# Patient Record
Sex: FEMALE | Race: BLACK OR AFRICAN AMERICAN | Employment: FULL TIME | RURAL
[De-identification: names, ages, dates, MRNs, and addresses within clinical notes are randomized per-mention and may not be internally consistent; named-entity substitution may affect disease eponyms.]

---

## 2017-01-13 ENCOUNTER — TELEPHONE (OUTPATIENT)
Dept: FAMILY MEDICINE CLINIC | Age: 27
End: 2017-01-13

## 2017-01-13 DIAGNOSIS — R93.89 ABNORMAL CHEST X-RAY: Primary | ICD-10-CM

## 2017-01-13 NOTE — TELEPHONE ENCOUNTER
----- Message from Jose Dempsey sent at 1/13/2017 10:16 AM EST -----  Regarding: Luis Roger / Telephone  [phone]. Pt stated that when she was last seen on 8/31/16, NP Luis Roger put in orders for a cardiology MRI, but she was not able to be seen due to her schedule. She would like a new MRI order.

## 2017-01-25 ENCOUNTER — HOSPITAL ENCOUNTER (OUTPATIENT)
Dept: MRI IMAGING | Age: 27
Discharge: HOME | End: 2017-01-25
Attending: NURSE PRACTITIONER
Payer: COMMERCIAL

## 2017-01-25 DIAGNOSIS — R93.89 ABNORMAL CHEST X-RAY: ICD-10-CM

## 2017-01-25 PROCEDURE — 75557 CARDIAC MRI FOR MORPH: CPT

## 2017-01-26 ENCOUNTER — TELEPHONE (OUTPATIENT)
Dept: FAMILY MEDICINE CLINIC | Age: 27
End: 2017-01-26

## 2017-01-26 NOTE — TELEPHONE ENCOUNTER
Pt calling on lab results.  She was told she would be able to get them from Marie today from [facility]

## 2017-01-26 NOTE — TELEPHONE ENCOUNTER
Spoke with patient and advised per Maribell, results of MRI normal. Will send letter. Patient expressed understanding.

## 2017-01-26 NOTE — TELEPHONE ENCOUNTER
----- Message from Zaire Garcia sent at 1/26/2017  1:33 PM EST -----  Regarding: ABDIRIZAK Carter/Telephone  Pt would like results from MRI she had done on yesterday. Pt best contact number is [phone].

## 2017-03-28 ENCOUNTER — OFFICE VISIT (OUTPATIENT)
Dept: OBGYN CLINIC | Age: 27
End: 2017-03-28

## 2017-03-28 VITALS
HEIGHT: 64 IN | DIASTOLIC BLOOD PRESSURE: 90 MMHG | WEIGHT: 223.2 LBS | BODY MASS INDEX: 38.1 KG/M2 | SYSTOLIC BLOOD PRESSURE: 142 MMHG

## 2017-03-28 DIAGNOSIS — Z30.432 ENCOUNTER FOR IUD REMOVAL: Primary | ICD-10-CM

## 2017-03-28 DIAGNOSIS — Z30.433 ENCOUNTER FOR REMOVAL AND REINSERTION OF IUD: ICD-10-CM

## 2017-03-28 NOTE — PROGRESS NOTES
Chief Complaint   Removal Iud         JUVENAL Vicente is a 32 y.o. female who presents for the evaluation of removal of IUD, it is due to  on 2017. She is not sure if she wants another IUD and would like to discuss birth control options. Patient requested to have std testing done today. She is due for her annual in May and will call to schedule appointment. No LMP recorded. Patient is not currently having periods (Reason: IUD). Past Medical History:   Diagnosis Date    Abnormal chest x-ray     Abnormal Pap smear     Encounter for IUD insertion 12    mirena    HPV vaccine counseling     Pt completed Gardasil series    HSV-2 infection     PPD positive      Past Surgical History:   Procedure Laterality Date    HX COLPOSCOPY      negative     Social History     Occupational History    Not on file. Social History Main Topics    Smoking status: Never Smoker    Smokeless tobacco: Never Used    Alcohol use No    Drug use: No    Sexual activity: Yes     Partners: Male     Birth control/ protection: IUD     Family History   Problem Relation Age of Onset    Anemia Mother     Sickle Cell Anemia Sister        Allergies   Allergen Reactions    Codeine Unknown (comments)    Pcn [Penicillins] Unknown (comments)     Prior to Admission medications    Medication Sig Start Date End Date Taking? Authorizing Provider   MULTIVIT WITH CALCIUM,IRON,MIN [facility] DAILY MULTIVITAMIN PO) Take  by mouth.    Yes Phys Other, MD        Review of Systems: History obtained from the patient  Constitutional: negative for weight loss, fever, night sweats  Breast: negative for breast lumps, nipple discharge, galactorrhea  GI: negative for change in bowel habits, abdominal pain, black or bloody stools  : negative for frequency, dysuria, hematuria, vaginal discharge  MSK: negative for back pain, joint pain, muscle pain  Skin: negative for itching, rash, hives  Psych: negative for anxiety, depression, change in mood      Objective:  Visit Vitals    /90    Ht 5' 4\" (1.626 m)    Wt 223 lb 3.2 oz (101.2 kg)    BMI 38.31 kg/m2       Physical Exam:   PHYSICAL EXAMINATION    Constitutional  · Appearance: well-nourished, well developed, alert, in no acute distress    Gastrointestinal  · Abdominal Examination: abdomen non-tender to palpation, normal bowel sounds, no masses present  · Liver and spleen: no hepatomegaly present, spleen not palpable  · Hernias: no hernias identified    Genitourinary  · External Genitalia: normal appearance for age, no discharge present, no tenderness present, no inflammatory lesions present, no masses present, no atrophy present  · Vagina: normal vaginal vault without central or paravaginal defects, no discharge present, no inflammatory lesions present, no masses present  · Bladder: non-tender to palpation  · Urethra: appears normal  · Cervix: normal   · Uterus: normal size, shape and consistency  · Adnexa: no adnexal tenderness present, no adnexal masses present  · Perineum: perineum within normal limits, no evidence of trauma, no rashes or skin lesions present  · Anus: anus within normal limits, no hemorrhoids present  · Inguinal Lymph Nodes: no lymphadenopathy present    Skin  · General Inspection: no rash, no lesions identified    Neurologic/Psychiatric  · Mental Status:  · Orientation: grossly oriented to person, place and time  · Mood and Affect: mood normal, affect appropriate    Assessment:       Plan:         RTO prn if symptoms persist or worsen. Instructions given to pt. Handouts given to pt.

## 2017-03-28 NOTE — PROGRESS NOTES
[facility]  OFFICE PROCEDURE PROGRESS NOTE        Chart reviewed for the following:   Juan Del Angel LPN, have reviewed the History, Physical and updated the Allergic reactions for Stephanie Palomino     TIME OUT performed immediately prior to start of procedure:   Shelbie SZYMANSKI LPN, have performed the following reviews on Stephanie PADDY Palomino prior to the start of the procedure:            * Patient was identified by name and date of birth   * Agreement on procedure being performed was verified  * Risks and Benefits explained to the patient  * Procedure site verified and marked as necessary  * Patient was positioned for comfort  * Consent was signed and verified     Time: 3:06 PM        Date of procedure: 3/28/2017    Procedure performed by:  Christina Cortés MD    How tolerated by patient: tolerated the procedure well with no complications    Post Procedural Pain Scale: 2 - Hurts Little Bit    Comments: none          -----------------------------------IUD REPLACEMENT---------------------  Indications for Removal:  Shantel L Avon Phoenix is a ,  32 y.o. female [address] whose No LMP recorded. Patient is not currently having periods (Reason: IUD). was on . who presents today for IUD replacement. Her current IUD was placed 5 years ago, 2012. She has not had any problems with the IUD. She requests replacement of the IUD because the IUD effectiveness has . The IUD removal procedure was discussed with the patient and she had no further questions. Procedure: The patient was placed in a dorsal lithotomy position and appropriately draped. On bimanual exam the uterus was anterior and normal in size with no tenderness present. A speculum exam was performed and the cervix was visualized. The cervix was prepped with zephiran solution. The IUD string was visualized. Using ring forceps , the string was grasped and the IUD removed intact. The IUD was shown to the patient. -----------------------------------IUD INSERTION----------------------------------------- Indications: The risks, benefits and alternatives of IUD insertion were discussed in detail. She also has reviewed Mirena information. She has elected to proceed with the insertion today and she states she has no further questions. Procedure: The pelvic exam revealed normal external genitalia. On bimanual exam the uterus was anteverted and normal in size with no tenderness present. A speculum was inserted into the vagina and the cervix was visualized. The cervix was prepped with zephiran solution. The anterior lip of the cervix was grasped with a single toothed tenaculum. The uterus was sounded with a Koenig sound to 7 centimeters. A Mirena was then inserted without difficulty. The string was cut to 3 centimeters. She experienced a mild amount of cramping. Post Procedure Status: She tolerated the procedure with mild. The patient received Mirena lot number [id number].     Disc expulsion, infection, pregnancy  FU with AE and US

## 2017-05-05 ENCOUNTER — OFFICE VISIT (OUTPATIENT)
Dept: OBGYN CLINIC | Age: 27
End: 2017-05-05

## 2017-05-05 VITALS
BODY MASS INDEX: 38.07 KG/M2 | HEIGHT: 64 IN | SYSTOLIC BLOOD PRESSURE: 130 MMHG | DIASTOLIC BLOOD PRESSURE: 98 MMHG | WEIGHT: 223 LBS

## 2017-05-05 DIAGNOSIS — Z11.3 SCREENING EXAMINATION FOR VENEREAL DISEASE: ICD-10-CM

## 2017-05-05 DIAGNOSIS — Z01.419 ENCOUNTER FOR GYNECOLOGICAL EXAMINATION WITHOUT ABNORMAL FINDING: Primary | ICD-10-CM

## 2017-05-05 DIAGNOSIS — Z20.2 POSSIBLE EXPOSURE TO STD: ICD-10-CM

## 2017-05-05 DIAGNOSIS — N92.6 IRREGULAR BLEEDING: ICD-10-CM

## 2017-05-05 DIAGNOSIS — R93.89 ENDOMETRIAL THICKENING ON ULTRA SOUND: ICD-10-CM

## 2017-05-05 LAB
HCG URINE, QL. (POC): NEGATIVE
VALID INTERNAL CONTROL?: YES

## 2017-05-05 NOTE — PROGRESS NOTES
Sheridan Smith is a ,  32 y.o. female [address] whose No LMP recorded. Patient is not currently having periods (Reason: IUD). who presents for her annual checkup. She is having no significant problems. With regard to the Gardisil vaccine, she has received all 3 injections. Menstrual status:    Her periods are minimal to nonexistent in flow. She is using essentially none pads or tampons per day, minimal to none using Mirena. She denies dysmenorrhea. She reports no premenstrual symptoms. Contraception:    The current method of family planning is IUD. Sexual history:    She  reports that she currently engages in sexual activity and has had male partners. She reports using the following method of birth control/protection: IUD. Medical conditions:    Since her last annual GYN exam about one year ago, she has not the following changes in her health history: none. Pap and Mammogram History:    Her most recent Pap smear was normal obtained 2015. The patient has never had a mammogram.    The patient does not have a family history of breast cancer. Past Medical History:   Diagnosis Date    Abnormal chest x-ray     Abnormal Pap smear     Encounter for IUD insertion 2017    mirena replaced    HPV vaccine counseling     Pt completed Gardasil series    HSV-2 infection     PPD positive      Past Surgical History:   Procedure Laterality Date    HX COLPOSCOPY      negative       Current Outpatient Prescriptions   Medication Sig Dispense Refill    MULTIVIT WITH CALCIUM,IRON,MIN (WOMEN'S DAILY MULTIVITAMIN PO) Take  by mouth. Allergies: Codeine and Pcn [penicillins]   Social History     Social History    Marital status: SINGLE     Spouse name: N/A    Number of children: N/A    Years of education: N/A     Occupational History    Not on file.      Social History Main Topics    Smoking status: Never Smoker    Smokeless tobacco: Never Used   [facility] Alcohol use No    Drug use: No    Sexual activity: Yes     Partners: Male     Birth control/ protection: IUD     Other Topics Concern    Not on file     Social History Narrative     Tobacco History:  reports that she has never smoked. She has never used smokeless tobacco.  Alcohol Abuse:  reports that she does not drink alcohol. Drug Abuse:  reports that she does not use illicit drugs. There is no problem list on file for this patient.       Review of Systems - History obtained from the patient  Constitutional: negative for weight loss, fever, night sweats  HEENT: negative for hearing loss, earache, congestion, snoring, sorethroat  CV: negative for chest pain, palpitations, edema  Resp: negative for cough, shortness of breath, wheezing  GI: negative for change in bowel habits, abdominal pain, black or bloody stools  : negative for frequency, dysuria, hematuria, vaginal discharge  MSK: negative for back pain, joint pain, muscle pain  Breast: negative for breast lumps, nipple discharge, galactorrhea  Skin :negative for itching, rash, hives  Neuro: negative for dizziness, headache, confusion, weakness  Psych: negative for anxiety, depression, change in mood  Heme/lymph: negative for bleeding, bruising, pallor    Physical Exam    Visit Vitals    BP (!) 130/98    Ht 5' 4\" (1.626 m)    Wt 223 lb (101.2 kg)    BMI 38.28 kg/m2       Constitutional  · Appearance: well-nourished, well developed, alert, in no acute distress    HENT  · Head and Face: appears normal    Neck  · Inspection/Palpation: normal appearance, no masses or tenderness  · Lymph Nodes: no lymphadenopathy present  · Thyroid: gland size normal, nontender, no nodules or masses present on palpation    Chest  · Respiratory Effort: breathing normal  · Auscultation: normal breath sounds    Cardiovascular  · Heart:  · Auscultation: regular rate and rhythm without murmur    Breasts  · Inspection of Breasts: breasts symmetrical, no skin changes, no discharge present, nipple appearance normal, no skin retraction present  · Palpation of Breasts and Axillae: no masses present on palpation, no breast tenderness  · Axillary Lymph Nodes: no lymphadenopathy present    Gastrointestinal  · Abdominal Examination: abdomen non-tender to palpation, normal bowel sounds, no masses present  · Liver and spleen: no hepatomegaly present, spleen not palpable  · Hernias: no hernias identified    Genitourinary  · External Genitalia: normal appearance for age, no discharge present, no tenderness present, no inflammatory lesions present, no masses present, no atrophy present  · Vagina: normal vaginal vault without central or paravaginal defects, no discharge present, no inflammatory lesions present, no masses present  · Bladder: non-tender to palpation  · Urethra: appears normal  · Cervix: normal   · Uterus: normal size, shape and consistency  · Adnexa: no adnexal tenderness present, no adnexal masses present  · Perineum: perineum within normal limits, no evidence of trauma, no rashes or skin lesions present  · Anus: anus within normal limits, no hemorrhoids present  · Inguinal Lymph Nodes: no lymphadenopathy present    Skin  · General Inspection: no rash, no lesions identified    Neurologic/Psychiatric  · Mental Status:  · Orientation: grossly oriented to person, place and time  · Mood and Affect: mood normal, affect appropriate    . Assessment:  Routine gynecologic examination  Her current medical status is satisfactory with no evidence of significant gynecologic issues.     Plan:  Counseled re: diet, exercise, healthy lifestyle  Return for yearly wellness visits  Pap done  Desires STD testing, already known HSV 2 pos

## 2017-05-05 NOTE — PATIENT INSTRUCTIONS
Pelvic Exam: Care Instructions  Your Care Instructions    When your doctor examines all of your pelvic organs, it's called a pelvic exam. Two good reasons to have this kind of exam are to check for sexually transmitted infections (STIs) and to get a Pap test. A Pap test is also called a Pap smear. It checks for early changes that can lead to cancer of the cervix. Sometimes a pelvic exam is part of a regular checkup. In this case, you can do some things to make your test results as accurate as possible. · Try to schedule the exam when you don't have your period. · Don't use douches, tampons, or vaginal medicines, sprays, or powders for 24 hours before your exam.  · Don't have sex for 24 hours before your exam.  Other times, women have this kind of exam at any time of the month. This is because they have pelvic pain, bleeding, or discharge. Or they may have another pelvic problem. Before your exam, it's important to share some information with your doctor. For example, if you are a survivor of rape or sexual abuse, you can talk about any concerns you may have. Your doctor will also want to know if you are pregnant or use birth control. And he or she will want to hear about any problems, surgeries, or procedures you have had in your pelvic area. You will also need to tell your doctor when your last period was. Follow-up care is a key part of your treatment and safety. Be sure to make and go to all appointments, and call your doctor if you are having problems. It's also a good idea to know your test results and keep a list of the medicines you take. How is a pelvic exam done? · During a pelvic exam, you will:  ¨ Take off your clothes below the waist. You will get a paper or cloth cover to put over the lower half of your body. Uzma Pastures on your back on an exam table. Your feet will be raised above you. Stirrups will support your feet. · The doctor will:  Llana Kub you to relax your knees.  Your knees need to lean out, toward the walls. ¨ Check the opening of your vagina for sores or swelling. ¨ Gently put a tool called a speculum into your vagina. It opens the vagina a little bit. You will feel some pressure. But if you are relaxed, it will not hurt. It lets your doctor see inside the vagina. ¨ Use a small brush, spatula, or swab to get a sample of cells, if you are having a Pap test or culture. The doctor then removes the speculum. ¨ Put on gloves and put one or two fingers of one hand into your vagina. The other hand goes on your lower belly. This lets your doctor feel your pelvic organs. You will probably feel some pressure. Try to stay relaxed. ¨ Put one gloved finger into your rectum and one into your vagina, if needed. This can also help check your pelvic organs. This exam takes about 10 minutes. At the end, you will get a washcloth or tissue to clean your vaginal area. It's normal to have some discharge after this exam. You can then get dressed. Some test results may be ready right away. But results from a culture or a Pap test may take several days or a few weeks. Why should you have a pelvic exam?  · You want to have recommended screening tests. This includes a Pap test.  · You think you have a vaginal infection. Signs include itching, burning, or unusual discharge. · You might have been exposed to a sexually transmitted infection (STI), such as chlamydia or herpes. · You have vaginal bleeding that is not part of your normal menstrual period. · You have pain in your belly or pelvis. · You have been sexually assaulted. A pelvic exam lets your doctor collect evidence and check for STIs. · You are pregnant. · You are having trouble getting pregnant. What are the risks of a pelvic exam?  There are no risks from a pelvic exam.  When should you call for help?   Watch closely for changes in your health, and be sure to contact your doctor if:  · You have heavy bleeding or discharge from your vagina after the exam.  Where can you learn more? Go to http://azeal-maritza.info/. Enter C122 in the search box to learn more about \"Pelvic Exam: Care Instructions. \"  Current as of: October 13, 2016  Content Version: 11.2  © 7439-5169 [employer]. Care instructions adapted under license by [employer] (which disclaims liability or warranty for this information). If you have questions about a medical condition or this instruction, always ask your healthcare professional. [address] any warranty or liability for your use of this information.

## 2017-05-05 NOTE — PROGRESS NOTES
This is a follow-up visit for Luis Gonsalez is a ,  32 y.o. female [address] whose No LMP recorded. Patient is not currently having periods (Reason: IUD). She had an Mirena IUD placed six weeks ago. Since the IUD placement, the patient has not had any unusual complaints. She has had some mild non-menstrual bleeding. She describes having a spotting amount of blood-tinged discharge which has occurred off and on since insertion of the Mirena. She has not had significant pain. She has had no fever. Associated signs and symptoms: she denies dyspareunia, expulsion, heavy bleeding, increased pain, fever, and pelvic pain. Ultrasound was done today and revealed appropriate placement of the IUD in the endometrial cavity. UTERUS IS ANTEVERTED, NORMAL IN SIZE AND ECHOGENICITY. ENDOMETRIUM IS HETEROGENOUS, IRREGULAR AND MEASURES 5-6MM IN THICKNESS. THERE  APPEARS TO BE 6MM OF FLUID WITHIN THE ENDOMETRIUM. THERE APPEARS TO BE INCREASED  BLOODFLOW. A POLYP MAY BE PRESENT. IUD IS SEEN IN THE PROPER POSITION WITHIN THE ENDOMETRIAL CAVITY IN THE UTERINE FUNDUS. RIGHT OVARY APPEARS WITHIN NORMAL LIMITS. LEFT OVARY APPEARS WITHIN NORMAL LIMITS. TWO FOLLICULAR CYSTS ARE SEEN. SCANT FREE FLUID SEEN IN THE CDS  Past Medical History:   Diagnosis Date    Abnormal chest x-ray     Abnormal Pap smear     Encounter for IUD insertion 2017    mirena replaced    HPV vaccine counseling     Pt completed Gardasil series    HSV-2 infection     PPD positive      Past Surgical History:   Procedure Laterality Date    HX COLPOSCOPY      negative     Social History     Occupational History    Not on file.      Social History Main Topics    Smoking status: Never Smoker    Smokeless tobacco: Never Used    Alcohol use No    Drug use: No    Sexual activity: Yes     Partners: Male     Birth control/ protection: IUD     Family History   Problem Relation Age of Onset    Anemia Mother    [facility] Sickle Cell Anemia Sister        Allergies   Allergen Reactions    Codeine Unknown (comments)    Pcn [Penicillins] Unknown (comments)     Prior to Admission medications    Medication Sig Start Date End Date Taking? Authorizing Provider   MULTIVIT WITH CALCIUM,IRON,MIN [facility] DAILY MULTIVITAMIN PO) Take  by mouth.    Yes Phys Other, MD        Review of Systems: History obtained from the patient  Constitutional: negative for weight loss, fever, night sweats  Breast: negative for breast lumps, nipple discharge, galactorrhea  GI: negative for change in bowel habits, abdominal pain, black or bloody stools  : negative for frequency, dysuria, hematuria, vaginal discharge  MSK: negative for back pain, joint pain, muscle pain  Skin: negative for itching, rash, hives  Psych: negative for anxiety, depression, change in mood      Objective:  Visit Vitals    BP (!) 130/98    Ht 5' 4\" (1.626 m)    Wt 223 lb (101.2 kg)    BMI 38.28 kg/m2       Physical Exam:   PHYSICAL EXAMINATION    Constitutional  · Appearance: well-nourished, well developed, alert, in no acute distress    Gastrointestinal  · Abdominal Examination: abdomen non-tender to palpation, normal bowel sounds, no masses present  · Liver and spleen: no hepatomegaly present, spleen not palpable  · Hernias: no hernias identified    Genitourinary  · External Genitalia: normal appearance for age, no discharge present, no tenderness present, no inflammatory lesions present, no masses present, no atrophy present  · Vagina: normal vaginal vault without central or paravaginal defects, no discharge present, no inflammatory lesions present, no masses present  · Bladder: non-tender to palpation  · Urethra: appears normal  · Cervix: normal with IUD string visible and appropriate length   · Uterus: normal size, shape and consistency  · Adnexa: no adnexal tenderness present, no adnexal masses present  · Perineum: perineum within normal limits, no evidence of trauma, no rashes or skin lesions present    Skin  · General Inspection: no rash, no lesions identified    Neurologic/Psychiatric  · Mental Status:  · Orientation: grossly oriented to person, place and time  · Mood and Affect: mood normal, affect appropriate    Assessment:   Doing well with IUD  Fluid and likely end polyp on US today    Plan:   Repeat US in 4-6 weeks.  Consider end bx or hyst D&C to remove polyp

## 2017-05-06 LAB
COMMENT, 144067: NORMAL
HBV SURFACE AG SERPL QL IA: NEGATIVE
HCV AB S/CO SERPL IA: <0.1 S/CO RATIO (ref 0–0.9)
HIV 1+2 AB+HIV1 P24 AG SERPL QL IA: NON REACTIVE
RPR SER QL: NON REACTIVE

## 2017-05-07 LAB
C TRACH RRNA SPEC QL NAA+PROBE: NEGATIVE
N GONORRHOEA RRNA SPEC QL NAA+PROBE: NEGATIVE
T VAGINALIS RRNA SPEC QL NAA+PROBE: NEGATIVE

## 2017-05-10 ENCOUNTER — TELEPHONE (OUTPATIENT)
Dept: OBGYN CLINIC | Age: 27
End: 2017-05-10

## 2017-05-10 LAB
CYTOLOGIST CVX/VAG CYTO: NORMAL
CYTOLOGY CVX/VAG DOC THIN PREP: NORMAL
CYTOLOGY HISTORY:: NORMAL
DX ICD CODE: NORMAL
LABCORP, 190119: NORMAL
Lab: NORMAL
Lab: NORMAL
OTHER STN SPEC: NORMAL
PATH REPORT.FINAL DX SPEC: NORMAL
STAT OF ADQ CVX/VAG CYTO-IMP: NORMAL

## 2017-05-10 NOTE — TELEPHONE ENCOUNTER
Patient called wanting to know if the HIV testing was done 5/5/2017. [facility] Informed pt that the test was done and negative per MD recommendation. She verbalized understanding.

## 2017-06-16 ENCOUNTER — OFFICE VISIT (OUTPATIENT)
Dept: OBGYN CLINIC | Age: 27
End: 2017-06-16

## 2017-06-16 VITALS
WEIGHT: 223 LBS | HEIGHT: 64 IN | DIASTOLIC BLOOD PRESSURE: 90 MMHG | SYSTOLIC BLOOD PRESSURE: 130 MMHG | BODY MASS INDEX: 38.07 KG/M2

## 2017-06-16 DIAGNOSIS — N84.0 ENDOMETRIAL POLYP: Primary | ICD-10-CM

## 2017-06-16 NOTE — PATIENT INSTRUCTIONS
Pelvic Exam: Care Instructions  Your Care Instructions    When your doctor examines all of your pelvic organs, it's called a pelvic exam. Two good reasons to have this kind of exam are to check for sexually transmitted infections (STIs) and to get a Pap test. A Pap test is also called a Pap smear. It checks for early changes that can lead to cancer of the cervix. Sometimes a pelvic exam is part of a regular checkup. In this case, you can do some things to make your test results as accurate as possible. · Try to schedule the exam when you don't have your period. · Don't use douches, tampons, or vaginal medicines, sprays, or powders for 24 hours before your exam.  · Don't have sex for 24 hours before your exam.  Other times, women have this kind of exam at any time of the month. This is because they have pelvic pain, bleeding, or discharge. Or they may have another pelvic problem. Before your exam, it's important to share some information with your doctor. For example, if you are a survivor of rape or sexual abuse, you can talk about any concerns you may have. Your doctor will also want to know if you are pregnant or use birth control. And he or she will want to hear about any problems, surgeries, or procedures you have had in your pelvic area. You will also need to tell your doctor when your last period was. Follow-up care is a key part of your treatment and safety. Be sure to make and go to all appointments, and call your doctor if you are having problems. It's also a good idea to know your test results and keep a list of the medicines you take. How is a pelvic exam done? · During a pelvic exam, you will:  ¨ Take off your clothes below the waist. You will get a paper or cloth cover to put over the lower half of your body. Elsie Canales on your back on an exam table. Your feet will be raised above you. Stirrups will support your feet. · The doctor will:  Beny Mercedess you to relax your knees.  Your knees need to lean out, toward the walls. ¨ Check the opening of your vagina for sores or swelling. ¨ Gently put a tool called a speculum into your vagina. It opens the vagina a little bit. You will feel some pressure. But if you are relaxed, it will not hurt. It lets your doctor see inside the vagina. ¨ Use a small brush, spatula, or swab to get a sample of cells, if you are having a Pap test or culture. The doctor then removes the speculum. ¨ Put on gloves and put one or two fingers of one hand into your vagina. The other hand goes on your lower belly. This lets your doctor feel your pelvic organs. You will probably feel some pressure. Try to stay relaxed. ¨ Put one gloved finger into your rectum and one into your vagina, if needed. This can also help check your pelvic organs. This exam takes about 10 minutes. At the end, you will get a washcloth or tissue to clean your vaginal area. It's normal to have some discharge after this exam. You can then get dressed. Some test results may be ready right away. But results from a culture or a Pap test may take several days or a few weeks. Why should you have a pelvic exam?  · You want to have recommended screening tests. This includes a Pap test.  · You think you have a vaginal infection. Signs include itching, burning, or unusual discharge. · You might have been exposed to a sexually transmitted infection (STI), such as chlamydia or herpes. · You have vaginal bleeding that is not part of your normal menstrual period. · You have pain in your belly or pelvis. · You have been sexually assaulted. A pelvic exam lets your doctor collect evidence and check for STIs. · You are pregnant. · You are having trouble getting pregnant. What are the risks of a pelvic exam?  There are no risks from a pelvic exam.  When should you call for help?   Watch closely for changes in your health, and be sure to contact your doctor if:  · You have heavy bleeding or discharge from your vagina after the exam.  Where can you learn more? Go to http://azael-maritza.info/. Enter X206 in the search box to learn more about \"Pelvic Exam: Care Instructions. \"  Current as of: October 13, 2016  Content Version: 11.2  © 3922-9387 [employer], [employer]. Care instructions adapted under license by [employer] (which disclaims liability or warranty for this information). If you have questions about a medical condition or this instruction, always ask your healthcare professional. [place] [address] any warranty or liability for your use of this information.

## 2017-06-16 NOTE — PROGRESS NOTES
Ultrasound followup    Deborah Botello is a 32 y.o. female is here today to review the results of her ultrasound evaluation. Her U/S evaluation is performed because of a previous encounter revealing ENDOMETRIUM IS HETEROGENOUS, IRREGULAR AND MEASURES 5-6MM IN THICKNESS. THERE  APPEARS TO BE 6MM OF FLUID WITHIN THE ENDOMETRIUM. THERE APPEARS TO BE INCREASED  BLOODFLOW. A POLYP MAY BE PRESENT. IUD IS SEEN IN THE PROPER POSITION WITHIN THE ENDOMETRIAL CAVITY IN THE UTERINE FUNDUS on ultrasound which was identified 5/2017. Plan:   Repeat US in 4-6 weeks. Consider end bx or hyst D&C to remove polyp    She is here for a follow up ultrasound study. The sonogram today showed:   UTERUS IS ANTEVERTED, NORMAL IN SIZE AND ECHOGENICITY. ENDOMETRIUM MEASURES 5-6MM IN THICKNESS. 3MM OF FLUID IS SEEN IN THE ENDOMETRIUM. THE  RIGHT SIDE OF THE ENDOMETRIUM APPEARS TO BE IRREGULAR. A POLYP CANNOT BE RULED OUT. IUD IS SEEN IN THE PROPER POSITION WITHIN THE ENDOMETRIAL CAVITY IN THE UTERINE FUNDUS. RIGHT OVARY APPEARS WITHIN NORMAL LIMITS. LEFT OVARY APPEARS WITHIN NORMAL LIMITS. SCANT FREE FLUID SEEN IN THE CDS. See detailed report for more information. Pt likely has small endometrial polyp. She has no bleeding currently with Mirena. Less fluid is visible. Will observe for now. Pt advised to call if she has BTB - at that time would consider removal. She has no prior hx of aub.      15 minutes was spent face to face with patient and >50% was spent counseling

## 2017-07-05 ENCOUNTER — TELEPHONE (OUTPATIENT)
Dept: OBGYN CLINIC | Age: 27
End: 2017-07-05

## 2017-07-07 ENCOUNTER — OFFICE VISIT (OUTPATIENT)
Dept: FAMILY MEDICINE CLINIC | Age: 27
End: 2017-07-07

## 2017-07-07 VITALS
HEIGHT: 64 IN | HEART RATE: 54 BPM | DIASTOLIC BLOOD PRESSURE: 104 MMHG | WEIGHT: 217 LBS | RESPIRATION RATE: 18 BRPM | BODY MASS INDEX: 37.05 KG/M2 | SYSTOLIC BLOOD PRESSURE: 141 MMHG | OXYGEN SATURATION: 99 % | TEMPERATURE: 97.8 F

## 2017-07-07 DIAGNOSIS — R30.0 DYSURIA: Primary | ICD-10-CM

## 2017-07-07 LAB
BILIRUB UR QL STRIP: NEGATIVE
GLUCOSE UR-MCNC: NEGATIVE MG/DL
KETONES P FAST UR STRIP-MCNC: NEGATIVE MG/DL
PH UR STRIP: 6.5 [PH] (ref 4.6–8)
PROT UR QL STRIP: NORMAL MG/DL
SP GR UR STRIP: 1.03 (ref 1–1.03)
UA UROBILINOGEN AMB POC: NORMAL (ref 0.2–1)
URINALYSIS CLARITY POC: CLEAR
URINALYSIS COLOR POC: YELLOW
URINE BLOOD POC: NEGATIVE
URINE LEUKOCYTES POC: NORMAL
URINE NITRITES POC: NEGATIVE

## 2017-07-07 RX ORDER — NITROFURANTOIN MACROCRYSTALS 50 MG/1
50 CAPSULE ORAL 4 TIMES DAILY
Qty: 28 CAP | Refills: 0 | Status: SHIPPED | OUTPATIENT
Start: 2017-07-07 | End: 2017-07-14

## 2017-07-07 NOTE — PROGRESS NOTES
Reviewed record in preparation for visit and have necessary documentation  Pt did not bring medication to office visit for review  opportunity was given for questions  Goals that were addressed and/or need to be completed during or after this appointment include     Health Maintenance Due   Topic Date Due    DTaP/Tdap/Td series (1 - Tdap) 06/04/2011

## 2017-07-07 NOTE — PATIENT INSTRUCTIONS
Urinary Tract Infection in Women: Care Instructions  Your Care Instructions    A urinary tract infection, or UTI, is a general term for an infection anywhere between the kidneys and the urethra (where urine comes out). Most UTIs are bladder infections. They often cause pain or burning when you urinate. UTIs are caused by bacteria and can be cured with antibiotics. Be sure to complete your treatment so that the infection goes away. Follow-up care is a key part of your treatment and safety. Be sure to make and go to all appointments, and call your doctor if you are having problems. It's also a good idea to know your test results and keep a list of the medicines you take. How can you care for yourself at home? · Take your antibiotics as directed. Do not stop taking them just because you feel better. You need to take the full course of antibiotics. · Drink extra water and other fluids for the next day or two. This may help wash out the bacteria that are causing the infection. (If you have kidney, heart, or liver disease and have to limit fluids, talk with your doctor before you increase your fluid intake.)  · Avoid drinks that are carbonated or have caffeine. They can irritate the bladder. · Urinate often. Try to empty your bladder each time. · To relieve pain, take a hot bath or lay a heating pad set on low over your lower belly or genital area. Never go to sleep with a heating pad in place. To prevent UTIs  · Drink plenty of water each day. This helps you urinate often, which clears bacteria from your system. (If you have kidney, heart, or liver disease and have to limit fluids, talk with your doctor before you increase your fluid intake.)  · Urinate when you need to. · Urinate right after you have sex. · Change sanitary pads often. · Avoid douches, bubble baths, feminine hygiene sprays, and other feminine hygiene products that have deodorants.   · After going to the bathroom, wipe from front to back.  When should you call for help? Call your doctor now or seek immediate medical care if:  · Symptoms such as fever, chills, nausea, or vomiting get worse or appear for the first time. · You have new pain in your back just below your rib cage. This is called flank pain. · There is new blood or pus in your urine. · You have any problems with your antibiotic medicine. Watch closely for changes in your health, and be sure to contact your doctor if:  · You are not getting better after taking an antibiotic for 2 days. · Your symptoms go away but then come back. Where can you learn more? Go to http://azael-maritza.info/. Enter C700 in the search box to learn more about \"Urinary Tract Infection in Women: Care Instructions. \"  Current as of: November 28, 2016  Content Version: 11.3  © 6329-5152 [employer], [employer]. Care instructions adapted under license by [employer] (which disclaims liability or warranty for this information). If you have questions about a medical condition or this instruction, always ask your healthcare professional. [address] any warranty or liability for your use of this information.

## 2017-07-07 NOTE — MR AVS SNAPSHOT
Visit Information Date & Time Provider Department Dept. Phone Encounter #  
 7/7/2017  2:00 PM Giles Jenkins MD  [facility] [id number] Upcoming Health Maintenance Date Due DTaP/Tdap/Td series (1 - Tdap) 6/4/2011 INFLUENZA AGE 9 TO ADULT 8/1/2017 PAP AKA CERVICAL CYTOLOGY 5/5/2020 Allergies as of 7/7/2017  Review Complete On: 7/7/2017 By: Felisa Gold Severity Noted Reaction Type Reactions Codeine  05/10/2010    Unknown (comments) Pcn [Penicillins]  05/10/2010    Unknown (comments) Current Immunizations  Never Reviewed Name Date HPV (Quad) 7/2/2015 11:14 AM, 2/24/2015 Not reviewed this visit You Were Diagnosed With   
  
 Codes Comments Dysuria    -  Primary ICD-10-CM: R30.0 ICD-9-CM: 555. 1 Vitals BP Pulse Temp Resp Height(growth percentile) Weight(growth percentile) (!) 141/104 (BP 1 Location: Left arm, BP Patient Position: Sitting) (!) 54 97.8 °F (36.6 °C) (Oral) 18 5' 4\" (1.626 m) 217 lb (98.4 kg) SpO2 BMI OB Status Smoking Status 99% 37.25 kg/m2 Unknown Never Smoker BMI and BSA Data Body Mass Index Body Surface Area  
 37.25 kg/m 2 2.11 m 2 Preferred Pharmacy Pharmacy Name Phone [address] [place], [place] - [address] [phone] Your Updated Medication List  
  
   
This list is accurate as of: 7/7/17  3:15 PM.  Always use your most recent med list.  
  
  
  
  
 nitrofurantoin 50 mg capsule Commonly known as:  MACRODANTIN Take 1 Cap by mouth four (4) times daily for 7 days. WOMEN'S DAILY MULTIVITAMIN PO Take  by mouth. Prescriptions Sent to Pharmacy Refills  
 nitrofurantoin (MACRODANTIN) 50 mg capsule 0 Sig: Take 1 Cap by mouth four (4) times daily for 7 days. Class: Normal  
 Pharmacy: [facility] #: [phone] Route: Oral  
  
We Performed the Following AMB POC URINALYSIS DIP STICK AUTO W/O MICRO [09246 CPT(R)] CULTURE, URINE [id number] CPT(R)] Patient Instructions Urinary Tract Infection in Women: Care Instructions Your Care Instructions A urinary tract infection, or UTI, is a general term for an infection anywhere between the kidneys and the urethra (where urine comes out). Most UTIs are bladder infections. They often cause pain or burning when you urinate. UTIs are caused by bacteria and can be cured with antibiotics. Be sure to complete your treatment so that the infection goes away. Follow-up care is a key part of your treatment and safety. Be sure to make and go to all appointments, and call your doctor if you are having problems. It's also a good idea to know your test results and keep a list of the medicines you take. How can you care for yourself at home? · Take your antibiotics as directed. Do not stop taking them just because you feel better. You need to take the full course of antibiotics. · Drink extra water and other fluids for the next day or two. This may help wash out the bacteria that are causing the infection. (If you have kidney, heart, or liver disease and have to limit fluids, talk with your doctor before you increase your fluid intake.) · Avoid drinks that are carbonated or have caffeine. They can irritate the bladder. · Urinate often. Try to empty your bladder each time. · To relieve pain, take a hot bath or lay a heating pad set on low over your lower belly or genital area. Never go to sleep with a heating pad in place. To prevent UTIs · Drink plenty of water each day. This helps you urinate often, which clears bacteria from your system. (If you have kidney, heart, or liver disease and have to limit fluids, talk with your doctor before you increase your fluid intake.) · Urinate when you need to. · Urinate right after you have sex. · Change sanitary pads often. · Avoid douches, bubble baths, feminine hygiene sprays, and other feminine hygiene products that have deodorants. · After going to the bathroom, wipe from front to back. When should you call for help? Call your doctor now or seek immediate medical care if: · Symptoms such as fever, chills, nausea, or vomiting get worse or appear for the first time. · You have new pain in your back just below your rib cage. This is called flank pain. · There is new blood or pus in your urine. · You have any problems with your antibiotic medicine. Watch closely for changes in your health, and be sure to contact your doctor if: 
· You are not getting better after taking an antibiotic for 2 days. · Your symptoms go away but then come back. Where can you learn more? Go to http://azael-maritza.info/. Enter R165 in the search box to learn more about \"Urinary Tract Infection in Women: Care Instructions. \" Current as of: November 28, 2016 Content Version: 11.3 © 8417-0889 [employer]. Care instructions adapted under license by [employer] (which disclaims liability or warranty for this information). If you have questions about a medical condition or this instruction, always ask your healthcare professional. [address] any warranty or liability for your use of this information. Introducing [facility]! Dear Cachorro Rahman: Thank you for requesting a Epoxy account. Our records indicate that you already have an active Epoxy account. You can access your account anytime at [email or web address] Did you know that you can access your hospital and ER discharge instructions at any time in Epoxy? You can also review all of your test results from your hospital stay or ER visit. Additional Information If you have questions, please visit the Frequently Asked Questions section of the Share Your Brain website at [email or web address] Remember, Share Your Brain is NOT to be used for urgent needs. For medical emergencies, dial 911. Now available from your iPhone and Android! Please provide this summary of care documentation to your next provider. If you have any questions after today's visit, please call [phone].

## 2017-07-10 NOTE — PROGRESS NOTES
I discussed the findings, assessment and plan in detail with the resident and agree with the resident's findings and plan as documented in the resident's note.     Mark Soares MD

## 2017-07-10 NOTE — PROGRESS NOTES
Progress Note    Patient: Karen Baker MRN: [id number]  SSN: [id number]    YOB: 1990  Age: 32 y.o. Sex: female        Chief Complaint   Patient presents with    Bladder Infection     X 2days         Subjective:     increased frequency - can't sya how often, but seems more than usual  Increased urgency - no accidents  sometimes having urge with small volume voids  No burning really, some tingling discomfort  Has had 1 UTI before, during pregnancy  Has tried drinking more water with no improvement  No fevers    Current and past medical information:    Current Medications after this visit[de-identified]     Current Outpatient Prescriptions   Medication Sig    nitrofurantoin (MACRODANTIN) 50 mg capsule Take 1 Cap by mouth four (4) times daily for 7 days.  MULTIVIT WITH CALCIUM,IRON,MIN (WOMEN'S DAILY MULTIVITAMIN PO) Take  by mouth. No current facility-administered medications for this visit. There are no active problems to display for this patient. Past Medical History:   Diagnosis Date    Abnormal chest x-ray     Abnormal Pap smear     Encounter for IUD insertion 03/28/2017    mirena replaced    HPV vaccine counseling     Pt completed Gardasil series    HSV-2 infection     PPD positive        Allergies   Allergen Reactions    Codeine Unknown (comments)    Pcn [Penicillins] Unknown (comments)       Past Surgical History:   Procedure Laterality Date    HX COLPOSCOPY  12/09    negative       Social History     Social History    Marital status: SINGLE     Spouse name: N/A    Number of children: N/A    Years of education: N/A     Social History Main Topics    Smoking status: Never Smoker    Smokeless tobacco: Never Used    Alcohol use No    Drug use: No    Sexual activity: Yes     Partners: Male     Birth control/ protection: IUD     Other Topics Concern    None     Social History Narrative       Review of Systems   Constitutional: Negative for chills and fever.    Respiratory: Negative for cough and wheezing. Skin: Negative for itching and rash. Objective:     Vitals:    07/07/17 1426   BP: (!) 141/104   Pulse: (!) 54   Resp: 18   Temp: 97.8 °F (36.6 °C)   TempSrc: Oral   SpO2: 99%   Weight: 217 lb (98.4 kg)   Height: 5' 4\" (1.626 m)      Body mass index is 37.25 kg/(m^2). Physical Exam   Constitutional: She is oriented to person, place, and time. No distress. HENT:   Head: Normocephalic and atraumatic. Eyes: Pupils are equal, round, and reactive to light. Right eye exhibits no discharge. Left eye exhibits no discharge. No scleral icterus. Cardiovascular: Normal rate and regular rhythm. Exam reveals no gallop and no friction rub. No murmur heard. Pulmonary/Chest: Effort normal. No respiratory distress. She has no wheezes. She has no rales. Abdominal: Soft. She exhibits no distension. There is no tenderness. Musculoskeletal: She exhibits no edema or tenderness. Lymphadenopathy:     She has no cervical adenopathy. Neurological: She is alert and oriented to person, place, and time. Skin: Skin is warm and dry. No rash noted. She is not diaphoretic. Psychiatric: She has a normal mood and affect. Her behavior is normal.   Nursing note and vitals reviewed. Assessment and Plan:       ICD-10-CM ICD-9-CM    1. Dysuria R30.0 788.1 AMB POC URINALYSIS DIP STICK AUTO W/O MICRO      CULTURE, URINE     Empiric nitrofurantoin    Plan of care:  Discussed diagnoses in detail with patient. Medication risks/benefits/side effects discussed with patient. All of the patient's questions were addressed. The patient understands and agrees with our plan of care. The patient knows to call back if they are unsure of or forget any changes we discussed today or if the symptoms change.      The patient received an After-Visit Summary which contains VS, orders, medication list and allergy list. This can be used as a \"mini-medical record\" should they have to seek medical care while out of town. Patient Care Team:  Ever Bishop MD (Obstetrics & Gynecology)    Follow-up Disposition: Not on File    No future appointments.     Signed By: Delma Mcgraw MD     July 7, 2017

## 2017-07-22 LAB
BACTERIA UR CULT: ABNORMAL
BACTERIA UR CULT: ABNORMAL

## 2017-11-03 ENCOUNTER — OFFICE VISIT (OUTPATIENT)
Dept: FAMILY MEDICINE CLINIC | Age: 27
End: 2017-11-03

## 2017-11-03 VITALS
HEIGHT: 64 IN | WEIGHT: 215 LBS | HEART RATE: 48 BPM | RESPIRATION RATE: 18 BRPM | DIASTOLIC BLOOD PRESSURE: 93 MMHG | OXYGEN SATURATION: 100 % | SYSTOLIC BLOOD PRESSURE: 150 MMHG | TEMPERATURE: 98.1 F | BODY MASS INDEX: 36.7 KG/M2

## 2017-11-03 DIAGNOSIS — G47.00 INSOMNIA, UNSPECIFIED TYPE: ICD-10-CM

## 2017-11-03 DIAGNOSIS — I10 ESSENTIAL HYPERTENSION: ICD-10-CM

## 2017-11-03 DIAGNOSIS — R06.83 SNORING: ICD-10-CM

## 2017-11-03 DIAGNOSIS — Z23 ENCOUNTER FOR IMMUNIZATION: ICD-10-CM

## 2017-11-03 DIAGNOSIS — Z00.00 WELL WOMAN EXAM (NO GYNECOLOGICAL EXAM): Primary | ICD-10-CM

## 2017-11-03 RX ORDER — HYDROCHLOROTHIAZIDE 12.5 MG/1
12.5 TABLET ORAL DAILY
Qty: 30 TAB | Refills: 2 | Status: SHIPPED | OUTPATIENT
Start: 2017-11-03 | End: 2018-01-16 | Stop reason: SDUPTHER

## 2017-11-03 RX ORDER — TRAZODONE HYDROCHLORIDE 50 MG/1
50 TABLET ORAL
Qty: 30 TAB | Refills: 2 | Status: SHIPPED | OUTPATIENT
Start: 2017-11-03 | End: 2018-05-11 | Stop reason: SDUPTHER

## 2017-11-03 NOTE — LETTER
11/3/2017 8:56 AM 
 
Ms. Stephanie THOMASON Candelario Thomas [address] [address] To Whom It May Concern: 
 
Love Vicente received an influenza vaccine on 11/03/17 Lot # [id number] Expiration Date 06/30/2018 70 \[facility] [place] [facility]\"" Sincerely, 
 
 
Uzma Penny MD

## 2017-11-03 NOTE — PATIENT INSTRUCTIONS
Influenza (Flu) Vaccine: Care Instructions  Your Care Instructions    Influenza (flu) is an infection in the lungs and breathing passages. It is caused by the influenza virus. There are different strains, or types, of the flu virus every year. The flu comes on quickly. It can cause a cough, stuffy nose, fever, chills, tiredness, and aches and pains. These symptoms may last up to 10 days. The flu can make you feel very sick, but most of the time it doesn't lead to other problems. But it can cause serious problems in people who are older or who have a long-term illness, such as heart disease or diabetes. You can help prevent the flu by getting a flu vaccine every year, as soon as it is available. You cannot get the flu from the vaccine. The vaccine prevents most cases of the flu. But even when the vaccine doesn't prevent the flu, it can make symptoms less severe and reduce the chance of problems from the flu. Sometimes, young children and people who have an immune system problem may have a slight fever or muscle aches or pains 6 to 12 hours after getting the shot. These symptoms usually last 1 or 2 days. Follow-up care is a key part of your treatment and safety. Be sure to make and go to all appointments, and call your doctor if you are having problems. It's also a good idea to know your test results and keep a list of the medicines you take. Who should get the flu vaccine? Everyone age 7 months or older should get a flu vaccine each year. It lowers the chance of getting and spreading the flu. The vaccine is very important for people who are at high risk for getting other health problems from the flu. This includes:  · Anyone 48years of age or older. · People who live in a long-term care center, such as a nursing home. · All children 6 months through 25years of age. · Adults and children 6 months and older who have long-term heart or lung problems, such as asthma.   · Adults and children 6 months and older who needed medical care or were in a hospital during the past year because of diabetes, chronic kidney disease, or a weak immune system (including HIV or AIDS). · Women who will be pregnant during the flu season. · People who have any condition that can make it hard to breathe or swallow (such as a brain injury or muscle disorders). · People who can give the flu to others who are at high risk for problems from the flu. This includes all health care workers and close contacts of people age 72 or older. Who should not get the flu vaccine? The person who gives the vaccine may tell you not to get it if you:  · Have a severe allergy to eggs or any part of the vaccine. · Have had a severe reaction to a flu vaccine in the past.  · Have had Guillain-Barré syndrome (GBS). · Are sick with a fever. (Get the vaccine when symptoms are gone.)  How can you care for yourself at home? · If you or your child has a sore arm or a slight fever after the shot, take an over-the-counter pain medicine, such as acetaminophen (Tylenol) or ibuprofen (Advil, Motrin). Read and follow all instructions on the label. Do not give aspirin to anyone younger than 20. It has been linked to Reye syndrome, a serious illness. · Do not take two or more pain medicines at the same time unless the doctor told you to. Many pain medicines have acetaminophen, which is Tylenol. Too much acetaminophen (Tylenol) can be harmful. When should you call for help? Call 911 anytime you think you may need emergency care. For example, call if after getting the flu vaccine:  ? · You have symptoms of a severe reaction to the flu vaccine. Symptoms of a severe reaction may include:  ¨ Severe difficulty breathing. ¨ Sudden raised, red areas (hives) all over your body. ¨ Severe lightheadedness. ?Call your doctor now or seek immediate medical care if after getting the flu vaccine:  ? · You think you are having a reaction to the flu vaccine, such as a new fever. ?Watch closely for changes in your health, and be sure to contact your doctor if you have any problems. Where can you learn more? Go to http://azael-maritza.info/. Enter R431 in the search box to learn more about \"Influenza (Flu) Vaccine: Care Instructions. \"  Current as of: September 24, 2016  Content Version: 11.4  © 3718-6956 [employer]. Care instructions adapted under license by [employer] (which disclaims liability or warranty for this information). If you have questions about a medical condition or this instruction, always ask your healthcare professional. [address] any warranty or liability for your use of this information.

## 2017-11-03 NOTE — PROGRESS NOTES
Stephanie Rosado  32 y.o. female  1990  [address] [facility]  [id number]     [facility]: Progress Note       Encounter Date: 11/3/2017    Chief Complaint   Patient presents with    Physical     discuss recent high blood pressure     History of Present Illness   Stephanie Benjamin is a 32 y.o. female who presents to clinic today for:    Wellness  Patient reports that she is generally doing well. However she is concerned about her BP. She is a shift worked and as difficulty sleeping. Has tried Roxana Cee. She has difficulty staying asleep. Patient endorses snoring, and non-restorative. Also endorses sleepiness after working her shift and had to pull off the road and take a nap. Health Maintenance  Flu shot today. Health Maintenance Due   Topic Date Due    DTaP/Tdap/Td series (1 - Tdap) 06/04/2011     Review of Systems   Review of Systems   Constitutional: Positive for malaise/fatigue. Negative for chills. Eyes: Negative for pain and discharge. Respiratory: Negative for cough and shortness of breath. Cardiovascular: Negative for chest pain, palpitations and leg swelling. Gastrointestinal: Negative for abdominal pain, constipation, nausea and vomiting. Musculoskeletal: Negative for falls, myalgias and neck pain. Skin: Negative for itching and rash. Neurological: Negative for dizziness, weakness and headaches. Psychiatric/Behavioral: Negative for depression, hallucinations, substance abuse and suicidal ideas. The patient has insomnia. The patient is not nervous/anxious. Vitals/Objective:     Vitals:    11/03/17 0832   BP: (!) 150/93   Pulse: (!) 48   Resp: 18   Temp: 98.1 °F (36.7 °C)   TempSrc: Oral   SpO2: 100%   Weight: 215 lb (97.5 kg)   Height: 5' 4\" (1.626 m)     Body mass index is 36.9 kg/(m^2). Physical Exam   Constitutional: She is oriented to person, place, and time. She appears well-nourished. No distress.    HENT:   Head: Normocephalic and atraumatic. Eyes: Conjunctivae are normal.   Neck: Normal range of motion. Neck supple. No thyromegaly present. Cardiovascular: Normal rate, regular rhythm and normal heart sounds. Pulmonary/Chest: Effort normal and breath sounds normal. No respiratory distress. She has no wheezes. Abdominal: Soft. Musculoskeletal: She exhibits no edema or tenderness. Neurological: She is alert and oriented to person, place, and time. Skin: Skin is warm and dry. No erythema. No results found for this or any previous visit (from the past 24 hour(s)). Assessment and Plan:     Encounter Diagnoses     ICD-10-CM ICD-9-CM   1. Well woman exam (no gynecological exam) Z00.00 V70.0   2. Essential hypertension I10 401.9   3. Insomnia, unspecified type G47.00 780.52   4. Snoring R06.83 786.09   5. Encounter for immunization Z23 V03.89       1. Well woman exam (no gynecological exam)  - METABOLIC PANEL, COMPREHENSIVE    2. Essential hypertension  Start HCTZ (nini has IUD) and check blood work. See patient back in 1-2 months for recheck BP. Sleep study also for r/u JOSEPHINE  - METABOLIC PANEL, COMPREHENSIVE  - LIPID PANEL  - TSH 3RD GENERATION  - hydroCHLOROthiazide (HYDRODIURIL) 12.5 mg tablet; Take 1 Tab by mouth daily. Dispense: 30 Tab; Refill: 2    3. Insomnia, unspecified type  4. Snoring  Will refer for home sleep testing and start medication to assist with insomnia   - traZODone (DESYREL) 50 mg tablet; Take 1 Tab by mouth nightly. Dispense: 30 Tab; Refill: 2    5. Encounter for immunization  - INFLUENZA VIRUS VACCINE QUADRIVALENT, PRESERVATIVE FREE SYRINGE (85255)  - MN IMMUNIZ ADMIN,1 SINGLE/COMB VAC/TOXOID    I have discussed the diagnosis with the patient and the intended plan as seen in the above orders. she has expressed understanding. The patient has received an after-visit summary and questions were answered concerning future plans.   I have discussed medication side effects and warnings with the patient as well.    Electronically Signed: Vinny Guadalupe MD     History/Allergies   Patients past medical, surgical and family histories were reviewed and updated. Past Medical History:   Diagnosis Date    Abnormal chest x-ray     Abnormal Pap smear     Encounter for IUD insertion 03/28/2017    mirena replaced    HPV vaccine counseling     Pt completed Gardasil series    HSV-2 infection     PPD positive       Past Surgical History:   Procedure Laterality Date    HX COLPOSCOPY  12/09    negative     Family History   Problem Relation Age of Onset    Anemia Mother     Sickle Cell Anemia Sister     Hypertension Father     Hypertension Paternal Grandmother      Social History     Social History    Marital status: SINGLE     Spouse name: N/A    Number of children: N/A    Years of education: N/A     Occupational History    Not on file. Social History Main Topics    Smoking status: Never Smoker    Smokeless tobacco: Never Used    Alcohol use No    Drug use: No    Sexual activity: Yes     Partners: Male     Birth control/ protection: IUD     Other Topics Concern    Not on file     Social History Narrative         Allergies   Allergen Reactions    Codeine Unknown (comments)    Pcn [Penicillins] Unknown (comments)       Disposition     Follow-up Disposition:  Return in about 1 month (around 12/3/2017) for Blood pressure. .    No future appointments. Current Medications after this visit     Current Outpatient Prescriptions   Medication Sig    traZODone (DESYREL) 50 mg tablet Take 1 Tab by mouth nightly.  hydroCHLOROthiazide (HYDRODIURIL) 12.5 mg tablet Take 1 Tab by mouth daily.  MULTIVIT WITH CALCIUM,IRON,MIN (WOMEN'S DAILY MULTIVITAMIN PO) Take  by mouth. No current facility-administered medications for this visit. There are no discontinued medications.

## 2017-11-03 NOTE — MR AVS SNAPSHOT
Visit Information Date & Time Provider Department Dept. Phone Encounter #  
 11/3/2017  8:20 AM Gini Coates MD [address] [place] [address] [id number] Follow-up Instructions Return in about 1 month (around 12/3/2017) for Blood pressure. Hortencia Gearing Upcoming Health Maintenance Date Due DTaP/Tdap/Td series (1 - Tdap) 6/4/2011 INFLUENZA AGE 9 TO ADULT 8/1/2017 PAP AKA CERVICAL CYTOLOGY 5/5/2020 Allergies as of 11/3/2017  Review Complete On: 11/3/2017 By: Gini Coates MD  
  
 Severity Noted Reaction Type Reactions Codeine  05/10/2010    Unknown (comments) Pcn [Penicillins]  05/10/2010    Unknown (comments) Current Immunizations  Never Reviewed Name Date HPV (Quad) 7/2/2015 11:14 AM, 2/24/2015 Influenza Vaccine (Quad) PF  Incomplete Not reviewed this visit You Were Diagnosed With   
  
 Codes Comments Well woman exam (no gynecological exam)    -  Primary ICD-10-CM: Z00.00 ICD-9-CM: V70.0 Essential hypertension     ICD-10-CM: I10 
ICD-9-CM: 401.9 Insomnia, unspecified type     ICD-10-CM: G47.00 ICD-9-CM: 780.52 Snoring     ICD-10-CM: R06.83 
ICD-9-CM: 786.09 Encounter for immunization     ICD-10-CM: T45 ICD-9-CM: V03.89 Vitals BP Pulse Temp Resp Height(growth percentile) Weight(growth percentile) (!) 150/93 (BP 1 Location: Right arm, BP Patient Position: Sitting) (!) 48 98.1 °F (36.7 °C) (Oral) 18 5' 4\" (1.626 m) 215 lb (97.5 kg) LMP SpO2 BMI OB Status Smoking Status 10/30/2017 100% 36.9 kg/m2 Unknown Never Smoker Vitals History BMI and BSA Data Body Mass Index Body Surface Area  
 36.9 kg/m 2 2.1 m 2 Preferred Pharmacy Pharmacy Name Phone [address] [address] [facility] [phone] Your Updated Medication List  
  
   
This list is accurate as of: 11/3/17  8:54 AM.  Always use your most recent med list.  
  
 hydroCHLOROthiazide 12.5 mg tablet Commonly known as:  HYDRODIURIL Take 1 Tab by mouth daily. traZODone 50 mg tablet Commonly known as:  Orma Paddy Take 1 Tab by mouth nightly. WOMEN'S DAILY MULTIVITAMIN PO Take  by mouth. Prescriptions Sent to Pharmacy Refills  
 traZODone (DESYREL) 50 mg tablet 2 Sig: Take 1 Tab by mouth nightly. Class: Normal  
 Pharmacy: [address] [place] [address] #: [phone] Route: Oral  
 hydroCHLOROthiazide (HYDRODIURIL) 12.5 mg tablet 2 Sig: Take 1 Tab by mouth daily. Class: Normal  
 Pharmacy: [address] [place] [address] Ph #: [phone] Route: Oral  
  
We Performed the Following INFLUENZA VIRUS VAC QUAD,SPLIT,PRESV FREE SYRINGE IM [id number] CPT(R)] LIPID PANEL [95927 CPT(R)] METABOLIC PANEL, COMPREHENSIVE [67822 CPT(R)] ME IMMUNIZ ADMIN,1 SINGLE/COMB VAC/TOXOID [id number] CPT(R)] TSH 3RD GENERATION [77197 CPT(R)] Follow-up Instructions Return in about 1 month (around 12/3/2017) for Blood pressure. .  
  
  
Patient Instructions Influenza (Flu) Vaccine: Care Instructions Your Care Instructions Influenza (flu) is an infection in the lungs and breathing passages. It is caused by the influenza virus. There are different strains, or types, of the flu virus every year. The flu comes on quickly. It can cause a cough, stuffy nose, fever, chills, tiredness, and aches and pains. These symptoms may last up to 10 days. The flu can make you feel very sick, but most of the time it doesn't lead to other problems. But it can cause serious problems in people who are older or who have a long-term illness, such as heart disease or diabetes. You can help prevent the flu by getting a flu vaccine every year, as soon as it is available. You cannot get the flu from the vaccine.  The vaccine prevents most cases of the flu. But even when the vaccine doesn't prevent the flu, it can make symptoms less severe and reduce the chance of problems from the flu. Sometimes, young children and people who have an immune system problem may have a slight fever or muscle aches or pains 6 to 12 hours after getting the shot. These symptoms usually last 1 or 2 days. Follow-up care is a key part of your treatment and safety. Be sure to make and go to all appointments, and call your doctor if you are having problems. It's also a good idea to know your test results and keep a list of the medicines you take. Who should get the flu vaccine? Everyone age 7 months or older should get a flu vaccine each year. It lowers the chance of getting and spreading the flu. The vaccine is very important for people who are at high risk for getting other health problems from the flu. This includes: · Anyone 48years of age or older. · People who live in a long-term care center, such as a nursing home. · All children 6 months through 25years of age. · Adults and children 6 months and older who have long-term heart or lung problems, such as asthma. · Adults and children 6 months and older who needed medical care or were in a hospital during the past year because of diabetes, chronic kidney disease, or a weak immune system (including HIV or AIDS). · Women who will be pregnant during the flu season. · People who have any condition that can make it hard to breathe or swallow (such as a brain injury or muscle disorders). · People who can give the flu to others who are at high risk for problems from the flu. This includes all health care workers and close contacts of people age 72 or older. Who should not get the flu vaccine? The person who gives the vaccine may tell you not to get it if you: 
· Have a severe allergy to eggs or any part of the vaccine.  
· Have had a severe reaction to a flu vaccine in the past. 
 · Have had Guillain-Barré syndrome (GBS). · Are sick with a fever. (Get the vaccine when symptoms are gone.) How can you care for yourself at home? · If you or your child has a sore arm or a slight fever after the shot, take an over-the-counter pain medicine, such as acetaminophen (Tylenol) or ibuprofen (Advil, Motrin). Read and follow all instructions on the label. Do not give aspirin to anyone younger than 20. It has been linked to Reye syndrome, a serious illness. · Do not take two or more pain medicines at the same time unless the doctor told you to. Many pain medicines have acetaminophen, which is Tylenol. Too much acetaminophen (Tylenol) can be harmful. When should you call for help? Call 911 anytime you think you may need emergency care. For example, call if after getting the flu vaccine: 
? · You have symptoms of a severe reaction to the flu vaccine. Symptoms of a severe reaction may include: ¨ Severe difficulty breathing. ¨ Sudden raised, red areas (hives) all over your body. ¨ Severe lightheadedness. ?Call your doctor now or seek immediate medical care if after getting the flu vaccine: 
? · You think you are having a reaction to the flu vaccine, such as a new fever. ? Watch closely for changes in your health, and be sure to contact your doctor if you have any problems. Where can you learn more? Go to http://azael-maritza.info/. Enter X145 in the search box to learn more about \"Influenza (Flu) Vaccine: Care Instructions. \" Current as of: September 24, 2016 Content Version: 11.4 © 5540-4798 [employer]. Care instructions adapted under license by [employer] (which disclaims liability or warranty for this information). If you have questions about a medical condition or this instruction, always ask your healthcare professional. [address] any warranty or liability for your use of this information. Introducing \[facility] [place] [facility]\"" & HEALTH SERVICES! Dear Bailey Merino: Thank you for requesting a Goldbely account. Our records indicate that you already have an active Goldbely account. You can access your account anytime at [email or web address] Did you know that you can access your hospital and ER discharge instructions at any time in Goldbely? You can also review all of your test results from your hospital stay or ER visit. Additional Information If you have questions, please visit the Frequently Asked Questions section of the Goldbely website at [email or web address] Remember, Goldbely is NOT to be used for urgent needs. For medical emergencies, dial 911. Now available from your iPhone and Android! Please provide this summary of care documentation to your next provider. If you have any questions after today's visit, please call [phone].

## 2017-11-03 NOTE — PROGRESS NOTES
1. Have you been to the ER, urgent care clinic since your last visit? Hospitalized since your last visit? No    2. Have you seen or consulted any other health care providers outside of the [address] [place] [address] since your last visit? Include any pap smears or colon screening.  No  Reviewed record in preparation for visit and have necessary documentation  opportunity was given for questions  Goals that were addressed and/or need to be completed during or after this appointment include    Health Maintenance Due   Topic Date Due    DTaP/Tdap/Td series (1 - Tdap) 06/04/2011    INFLUENZA AGE 9 TO ADULT  08/01/2017

## 2017-11-04 LAB
ALBUMIN SERPL-MCNC: 3.7 G/DL (ref 3.5–5.5)
ALBUMIN/GLOB SERPL: 1.3 {RATIO} (ref 1.2–2.2)
ALP SERPL-CCNC: 84 IU/L (ref 39–117)
ALT SERPL-CCNC: 14 IU/L (ref 0–32)
AST SERPL-CCNC: 18 IU/L (ref 0–40)
BILIRUB SERPL-MCNC: 0.2 MG/DL (ref 0–1.2)
BUN SERPL-MCNC: 9 MG/DL (ref 6–20)
BUN/CREAT SERPL: 11 (ref 9–23)
CALCIUM SERPL-MCNC: 8.9 MG/DL (ref 8.7–10.2)
CHLORIDE SERPL-SCNC: 103 MMOL/L (ref 96–106)
CHOLEST SERPL-MCNC: 169 MG/DL (ref 100–199)
CO2 SERPL-SCNC: 22 MMOL/L (ref 18–29)
CREAT SERPL-MCNC: 0.81 MG/DL (ref 0.57–1)
GFR SERPLBLD CREATININE-BSD FMLA CKD-EPI: 100 ML/MIN/1.73
GFR SERPLBLD CREATININE-BSD FMLA CKD-EPI: 115 ML/MIN/1.73
GLOBULIN SER CALC-MCNC: 2.8 G/DL (ref 1.5–4.5)
GLUCOSE SERPL-MCNC: 91 MG/DL (ref 65–99)
HDLC SERPL-MCNC: 45 MG/DL
LDLC SERPL CALC-MCNC: 113 MG/DL (ref 0–99)
POTASSIUM SERPL-SCNC: 4 MMOL/L (ref 3.5–5.2)
PROT SERPL-MCNC: 6.5 G/DL (ref 6–8.5)
SODIUM SERPL-SCNC: 140 MMOL/L (ref 134–144)
TRIGL SERPL-MCNC: 54 MG/DL (ref 0–149)
TSH SERPL DL<=0.005 MIU/L-ACNC: 1.63 UIU/ML (ref 0.45–4.5)
VLDLC SERPL CALC-MCNC: 11 MG/DL (ref 5–40)

## 2017-11-26 ENCOUNTER — HOSPITAL ENCOUNTER (EMERGENCY)
Age: 27
Discharge: HOME | End: 2017-11-26
Attending: EMERGENCY MEDICINE
Payer: COMMERCIAL

## 2017-11-26 VITALS
DIASTOLIC BLOOD PRESSURE: 99 MMHG | OXYGEN SATURATION: 95 % | SYSTOLIC BLOOD PRESSURE: 168 MMHG | RESPIRATION RATE: 26 BRPM | TEMPERATURE: 97.7 F | HEIGHT: 64 IN | HEART RATE: 70 BPM | BODY MASS INDEX: 35.85 KG/M2 | WEIGHT: 210 LBS

## 2017-11-26 DIAGNOSIS — N64.4 BREAST PAIN: Primary | ICD-10-CM

## 2017-11-26 PROCEDURE — 74011250637 HC RX REV CODE- 250/637: Performed by: PHYSICIAN ASSISTANT

## 2017-11-26 PROCEDURE — 99283 EMERGENCY DEPT VISIT LOW MDM: CPT

## 2017-11-26 RX ORDER — CLINDAMYCIN HYDROCHLORIDE 150 MG/1
300 CAPSULE ORAL EVERY 6 HOURS
Status: DISCONTINUED | OUTPATIENT
Start: 2017-11-26 | End: 2017-11-26 | Stop reason: HOSPADM

## 2017-11-26 RX ORDER — CLINDAMYCIN HYDROCHLORIDE 300 MG/1
300 CAPSULE ORAL 4 TIMES DAILY
Qty: 28 CAP | Refills: 0 | Status: SHIPPED | OUTPATIENT
Start: 2017-11-26 | End: 2017-12-03

## 2017-11-26 RX ORDER — HYDROCODONE BITARTRATE AND ACETAMINOPHEN 5; 325 MG/1; MG/1
1 TABLET ORAL
Qty: 20 TAB | Refills: 0 | Status: SHIPPED | OUTPATIENT
Start: 2017-11-26 | End: 2017-12-18

## 2017-11-26 RX ORDER — HYDROCODONE BITARTRATE AND ACETAMINOPHEN 5; 325 MG/1; MG/1
1 TABLET ORAL
Status: COMPLETED | OUTPATIENT
Start: 2017-11-26 | End: 2017-11-26

## 2017-11-26 RX ADMIN — HYDROCODONE BITARTRATE AND ACETAMINOPHEN 1 TABLET: 5; 325 TABLET ORAL at 16:43

## 2017-11-26 RX ADMIN — CLINDAMYCIN HYDROCHLORIDE 300 MG: 150 CAPSULE ORAL at 16:43

## 2017-11-26 NOTE — ED PROVIDER NOTES
HPI Comments: Eugene Bueno is a 32 y.o. female  who presents by private vehicle to ER with c/o Patient presents with:  Breast pain. Patient reports left nipple pain with noted mass underneath x 10 days worsening over the last few days with increasing pain. Patient denies fever or chills. Denies currently breast feeding or any injury to the area. Patient has been taking tylenol with minimal relief. She specifically denies any fevers, chills, nausea, vomiting, chest pain, shortness of breath, headache, rash, diarrhea, abdominal pain, urinary/bowel changes, sweating or weight loss. PCP: No primary care provider on file. 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 Patient is a 32 y.o. female presenting with breast pain. The history is provided by the patient. Breast pain    This is a new problem. The current episode started more than 1 week ago. The problem has been gradually worsening. The problem is associated with nothing. There has been no fever. The rash is present on the chest. The pain is at a severity of 6/10. The pain is mild. The pain has been constant since onset. Associated symptoms include pain.  Pertinent negatives include no blisters, no itching, no weeping and no hives. She has tried nothing for the symptoms. Past Medical History:   Diagnosis Date    Abnormal chest x-ray     Abnormal Pap smear     Encounter for IUD insertion 03/28/2017    mirena replaced    HPV vaccine counseling     Pt completed Gardasil series    HSV-2 infection     PPD positive        Past Surgical History:   Procedure Laterality Date    HX COLPOSCOPY  12/09    negative         Family History:   Problem Relation Age of Onset    Anemia Mother     Sickle Cell Anemia Sister     Hypertension Father     Hypertension Paternal Grandmother        Social History     Social History    Marital status: SINGLE     Spouse name: N/A    Number of children: N/A    Years of education: N/A     Occupational History    Not on file. Social History Main Topics    Smoking status: Never Smoker    Smokeless tobacco: Never Used    Alcohol use No    Drug use: No    Sexual activity: Yes     Partners: Male     Birth control/ protection: IUD     Other Topics Concern    Not on file     Social History Narrative         ALLERGIES: Codeine and Pcn [penicillins]    Review of Systems   Constitutional: Negative. HENT: Negative. Eyes: Negative. Respiratory: Negative. Cardiovascular: Negative. Gastrointestinal: Negative. Endocrine: Negative. Genitourinary: Negative. Musculoskeletal: Negative. Skin: Negative. Negative for itching. Allergic/Immunologic: Negative. Neurological: Negative. Hematological: Negative. Psychiatric/Behavioral: Negative. All other systems reviewed and are negative. Vitals:    11/26/17 1616   BP: (!) 168/99   Pulse: 70   Resp: 26   Temp: 97.7 °F (36.5 °C)   SpO2: 95%   Weight: 95.3 kg (210 lb)   Height: 5' 4\" (1.626 m)            Physical Exam   Constitutional: She is oriented to person, place, and time. She appears well-developed. HENT:   Head: Normocephalic and atraumatic.    Right Ear: External ear normal.   Left Ear: External ear normal.   Nose: Nose normal.   Mouth/Throat: Oropharynx is clear and moist. No oropharyngeal exudate. Eyes: Conjunctivae, EOM and lids are normal. Right eye exhibits no discharge. Left eye exhibits no discharge. Neck: Normal range of motion. No tracheal deviation present. No thyromegaly present. Cardiovascular: Normal rate, regular rhythm, normal heart sounds and intact distal pulses. Pulmonary/Chest: Effort normal and breath sounds normal.       Abdominal: Soft. Normal appearance and bowel sounds are normal.   Musculoskeletal: Normal range of motion. Neurological: She is alert and oriented to person, place, and time. Skin: Skin is warm and dry. Psychiatric: She has a normal mood and affect. Judgment normal.        MDM  Number of Diagnoses or Management Options  Breast pain:   Diagnosis management comments: Assesment/Plan- 32 y.o. Patient presents with:  Breast pain  differential includes: breast abscess, breast cyst, malignancy, cellulitis. Treat with pain medications and antibiotics. Recommend Breast Surgery follow up. Patient educated on reasons to return to the ED. Amount and/or Complexity of Data Reviewed  Discuss the patient with other providers: yes (Attending- Dr. Reg Pickard who also saw patient and agrees with plan)      ED Course       Procedures    CONSULT NOTE:   5:00 PM  Fadia Larkin PA-C spoke with Dr. Martin Found,   Specialty: Breast Surgeon  Discussed pt's hx, disposition, and available diagnostic and imaging results. Reviewed care plans. Consultant agrees with plans as outlined. Will see patient in office tomorrow. Yomaira Strong

## 2017-11-26 NOTE — DISCHARGE INSTRUCTIONS
Breast Pain: Care Instructions  Your Care Instructions    Breast tenderness and pain may come and go with your monthly periods (cyclic), or it may not follow any pattern (noncyclic). Breast pain is rarely caused by a serious health problem. You may need tests to find the cause. Follow-up care is a key part of your treatment and safety. Be sure to make and go to all appointments, and call your doctor if you are having problems. It's also a good idea to know your test results and keep a list of the medicines you take. How can you care for yourself at home? · If your doctor gave you medicine, take it exactly as prescribed. Call your doctor if you think you are having a problem with your medicine. · Take an over-the-counter pain medicine, such as acetaminophen (Tylenol), ibuprofen (Advil, Motrin), or naproxen (Aleve), to relieve pain and swelling. Read and follow all instructions on the label. · Do not take two or more pain medicines at the same time unless the doctor told you to. Many pain medicines have acetaminophen, which is Tylenol. Too much acetaminophen (Tylenol) can be harmful. · Wear a supportive bra, such as a sports bra or a jog bra. · Cut down on the amount of fat in your diet. If you need help planning healthy meals, see a dietitian. · Get at least 30 minutes of exercise on most days of the week. Walking is a good choice. You also may want to do other activities, such as running, swimming, cycling, or playing tennis or team sports. · Keep a healthy sleep pattern. Go to bed at the same time every night, and get up at the same time every day. When should you call for help? Call your doctor now or seek immediate medical care if:  ? · You have new changes in a breast, such as:  ¨ A lump or thickening in your breast or armpit. ¨ A change in the breast's size or shape. ¨ Skin changes, such as dimples or puckers. ¨ Nipple discharge.   ¨ A change in the color or feel of the skin of your breast or the darker area around the nipple (areola). ¨ A change in the shape of the nipple (it may look like it's being pulled into the breast). ? · You have symptoms of a breast infection, such as:  ¨ Increased pain, swelling, redness, or warmth around a breast.  ¨ Red streaks extending from the breast.  ¨ Pus draining from a breast.  ¨ A fever. ? Watch closely for changes in your health, and be sure to contact your doctor if:  ? · Your breast pain does not get better after 1 week. ? · You have a lump or thickening in your breast or armpit. Where can you learn more? Go to http://azael-maritza.info/. Enter T680 in the search box to learn more about \"Breast Pain: Care Instructions. \"  Current as of: March 20, 2017  Content Version: 11.4  © 3078-2991 [employer] ([employer]). Care instructions adapted under license by [employer] (which disclaims liability or warranty for this information). If you have questions about a medical condition or this instruction, always ask your healthcare professional. [address] any warranty or liability for your use of this information. We hope that we have addressed all of your medical concerns. The examination and treatment you received in the Emergency Department were for an emergent problem and were not intended as complete care. It is important that you follow up with your healthcare provider(s) for ongoing care. If your symptoms worsen or do not improve as expected, and you are unable to reach your usual health care provider(s), you should return to the Emergency Department. Today's healthcare is undergoing tremendous change, and patient satisfaction surveys are one of the many tools to assess the quality of medical care. You may receive a survey from the [employer] regarding your experience in the Emergency Department.   I hope that your experience has been completely positive, particularly the medical care that I provided. As such, please participate in the survey; anything less than excellent does not meet my expectations or intentions. [facility] and [facility] participate in nationally recognized quality of care measures. If your blood pressure is greater than 120/80, as reported below, we urge that you seek medical care to address the potential of high blood pressure, commonly known as hypertension. Hypertension can be hereditary or can be caused by certain medical conditions, pain, stress, or \"white coat syndrome. \"       Please make an appointment with your health care provider(s) for follow up of your Emergency Department visit. VITALS:   Patient Vitals for the past 8 hrs:   Temp Pulse Resp BP SpO2   11/26/17 1616 97.7 °F (36.5 °C) 70 26 (!) 168/99 95 %          Thank you for allowing us to provide you with medical care today. We realize that you have many choices for your emergency care needs. Please choose us in the future for any continued health care needs. Roxane Leach, [facility] Zak Ramirez: [phone]            No results found for this or any previous visit (from the past 24 hour(s)). No results found.

## 2017-11-27 ENCOUNTER — OFFICE VISIT (OUTPATIENT)
Dept: SURGERY | Age: 27
End: 2017-11-27

## 2017-11-27 VITALS
SYSTOLIC BLOOD PRESSURE: 147 MMHG | HEART RATE: 55 BPM | DIASTOLIC BLOOD PRESSURE: 95 MMHG | WEIGHT: 210 LBS | HEIGHT: 64 IN | BODY MASS INDEX: 35.85 KG/M2

## 2017-11-27 DIAGNOSIS — N61.1 BREAST ABSCESS: Primary | ICD-10-CM

## 2017-11-27 NOTE — PATIENT INSTRUCTIONS
Breast Abscess: Care Instructions  Your Care Instructions  An abscess is a pocket of pus formed by infection. Breast abscesses are most common in women who are breastfeeding. You can usually continue to breastfeed your baby in spite of a breast abscess. It will not harm your baby. If your doctor advises you to stop breastfeeding on the affected breast while it heals, you can continue breastfeeding from the healthy breast.  Sometimes antibiotics are used to treat a breast abscess. If antibiotics do not cure the abscess, it may need to be drained through a small cut (incision). You may have had a sedative to help you relax. You may be unsteady after having sedation. It can take a few hours for the medicine's effects to wear off. Common side effects of sedation include nausea, vomiting, and feeling sleepy or tired. The doctor has checked you carefully, but problems can develop later. If you notice any problems or new symptoms, get medical treatment right away. Follow-up care is a key part of your treatment and safety. Be sure to make and go to all appointments, and call your doctor if you are having problems. It's also a good idea to know your test results and keep a list of the medicines you take. How can you care for yourself at home? · If the doctor gave you a sedative:  ¨ For 24 hours, don't do anything that requires attention to detail. It takes time for the medicine's effects to completely wear off. ¨ For your safety, do not drive or operate any machinery that could be dangerous. Wait until the medicine wears off and you can think clearly and react easily. · If your doctor prescribed antibiotics, take them as directed. Do not stop taking them just because you feel better. You need to take the full course of antibiotics. · If your doctor drained the abscess, you may have a tube or gauze in the abscess to allow it to continue draining.  Follow your doctor's instructions on bathing and caring for the wound.  · If you are breastfeeding, continue breastfeeding or pumping breast milk, as your doctor advises. It is important to empty your breasts regularly. However, your doctor may advise you to discard the milk from the affected breast until the abscess heals. ¨ Before breastfeeding, place a warm, wet washcloth over the breast for about 15 minutes. Try this at least 3 times a day. ¨ If pus is no longer draining from the abscess, breastfeed on both sides. ¨ Gently massage your breast to stimulate milk flow. ¨ Pump or hand-express a small amount of breast milk before breastfeeding if your breasts are too full with milk or if it hurts too much to nurse. This will make your breasts less full and may make it easier for your baby to nurse. ¨ Try feeding from the healthy breast. Then, after your milk is flowing, breastfeed from the affected breast until it feels soft. · Be safe with medicines. Take pain medicines exactly as directed. ¨ If your doctor gave you a prescription medicine for pain, take it as prescribed. ¨ If you are not taking a prescription pain medicine, ask your doctor if you can take an over-the-counter medicine. ¨ Do not take two or more pain medicines at the same time unless your doctor told you to. Many pain medicines have acetaminophen, which is Tylenol. Too much acetaminophen (Tylenol) can be harmful. · Put ice or a cold pack on your breast for 10 to 15 minutes at a time to reduce pain and swelling. If you are breastfeeding, do this between feedings. Put a thin cloth between the ice and your skin. · If pus is draining from your infected breast, wash the nipple gently and let it air-dry before you put your bra back on. A disposable breast pad placed in the bra cup will help absorb the pus. When should you call for help? Call 911 anytime you think you may need emergency care. For example, call if:  ? · You have trouble breathing. ? · You passed out (lost consciousness).    ?Call your doctor now or seek immediate medical care if:  ? · You have new or worse nausea or vomiting. ? · Your symptoms of infection get worse. This may include:  ¨ Increased pain, swelling, redness, or warmth around a breast.  ¨ Red streaks extending from the breast.  ¨ Pus draining from a breast.  ¨ A new or higher fever. ? Watch closely for changes in your health, and be sure to contact your doctor if:  ? · You do not get better as expected. Where can you learn more? Go to http://azael-maritza.info/. Enter Y635 in the search box to learn more about \"Breast Abscess: Care Instructions. \"  Current as of: October 13, 2016  Content Version: 11.4  © 9382-2932 [employer]. Care instructions adapted under license by [employer] (which disclaims liability or warranty for this information). If you have questions about a medical condition or this instruction, always ask your healthcare professional. [place] [address] any warranty or liability for your use of this information.

## 2017-11-27 NOTE — PROGRESS NOTES
HISTORY OF PRESENT ILLNESS  Stephanie Reagan is a 32 y.o. female. HPI  NEW patient consult referred by Dr. Mouna Brown for LEFT breast lump/abscess. Ten days ago, patient noticed a lump and tenderness to her LEFT breast.  The lump is under her nipple and areola and spreads upward above the nipple. It has gotten larger and more painful. The skin is red and warm to touch. She has some white discharge come out of the nipple earlier today. Went to the ER yesterday and they gave her hydrocodone and clindamycin. Past Medical History:   Diagnosis Date    Abnormal chest x-ray     Abnormal Pap smear     Encounter for IUD insertion 03/28/2017    mirena replaced    HPV vaccine counseling     Pt completed Gardasil series    HSV-2 infection     PPD positive        Past Surgical History:   Procedure Laterality Date    HX COLPOSCOPY  12/09    negative    HX HEENT  2015    wisdom tooth extraction       Social History     Social History    Marital status: SINGLE     Spouse name: N/A    Number of children: N/A    Years of education: N/A     Occupational History    Not on file. Social History Main Topics    Smoking status: Never Smoker    Smokeless tobacco: Never Used    Alcohol use No    Drug use: No    Sexual activity: Yes     Partners: Male     Birth control/ protection: IUD     Other Topics Concern    Not on file     Social History Narrative       Current Outpatient Prescriptions on File Prior to Visit   Medication Sig Dispense Refill    clindamycin (CLEOCIN) 300 mg capsule Take 1 Cap by mouth four (4) times daily for 7 days. 28 Cap 0    HYDROcodone-acetaminophen (NORCO) 5-325 mg per tablet Take 1 Tab by mouth every four (4) hours as needed for Pain. Max Daily Amount: 6 Tabs. 20 Tab 0    traZODone (DESYREL) 50 mg tablet Take 1 Tab by mouth nightly. 30 Tab 2    hydroCHLOROthiazide (HYDRODIURIL) 12.5 mg tablet Take 1 Tab by mouth daily.  30 Tab 2    MULTIVIT WITH CALCIUM,IRON,MIN [facility] DAILY MULTIVITAMIN PO) Take  by mouth. Current Facility-Administered Medications on File Prior to Visit   Medication Dose Route Frequency Provider Last Rate Last Dose    [COMPLETED] HYDROcodone-acetaminophen (NORCO) 5-325 mg per tablet 1 Tab  1 Tab Oral ONCE PRN AMBER Barnett   1 Tab at 17 1643    [DISCONTINUED] clindamycin (CLEOCIN) capsule 300 mg  300 mg Oral Q6H AMBER Johnson   300 mg at 17 1643       Allergies   Allergen Reactions    Codeine Hives and Itching    Pcn [Penicillins] Hives and Itching       OB History      Para Term  AB Living    3 1 1  2 1    SAB TAB Ectopic Molar Multiple Live Births    1 1            Obstetric Comments    Menarche:  15. LMP: IUD. # of Children:  1. Age at Delivery of First Child:  23.   Hysterectomy/oophorectomy:  NO/NO. Breast Bx:  no.  Hx of Breast Feeding:  yes. BCP:  yes. Hormone therapy:  no.           ROS  Constitutional: Negative    HENT: Negative. Eyes: Negative. Respiratory: Negative. Cardiovascular: Negative. Gastrointestinal: Negative. Genitourinary: Negative. Musculoskeletal: Negative. Skin: Negative. Neurological: Negative. Endo/Heme/Allergies: Negative. Psychiatric/Behavioral: Negative. Physical Exam   Cardiovascular: Normal rate and normal heart sounds. Pulmonary/Chest: Breath sounds normal. Right breast exhibits no inverted nipple, no mass, no nipple discharge, no skin change and no tenderness. Left breast exhibits mass and tenderness. Left breast exhibits no inverted nipple, no nipple discharge and no skin change. Breasts are symmetrical.       Lymphadenopathy:        Right cervical: No superficial cervical, no deep cervical and no posterior cervical adenopathy present. Left cervical: No superficial cervical, no deep cervical and no posterior cervical adenopathy present. Right axillary: No pectoral and no lateral adenopathy present.         Left axillary: No pectoral and no lateral adenopathy present. BREAST ULTRASOUND  Indication: Left breast mass retroareolar  Technique: The area was scanned using a high-frequency linear-array near-field transducer  Findings: Hypoechoic collection with acoustic enhancement  Impression: Breast abscess  Disposition: F/u in 1 week    ASSESSMENT and PLAN    ICD-10-CM ICD-9-CM    1. Breast abscess N61.1 611.0      Pt presents with LEFT nipple mass with discomfort, likely related to infected sebaceous cyst. Pt states some drainage occurred this morning, and this was the only occurrence. Abscess seen on US, but site is not ready for I&D. Advised to observe and continue on antibiotics/pain medication. Also advised to apply warm compresses to site to promote self-drainage. F/u in 1 week for reexamination. This plan was reviewed with the patient and patient agrees. All questions were answered.     Written by Tabitha Baker, as dictated by Dr. Alexander Carolina MD.

## 2017-11-27 NOTE — COMMUNICATION BODY
HISTORY OF PRESENT ILLNESS  Stephanie Rivas is a 32 y.o. female. HPI  NEW patient consult referred by Dr. Hemal Fuentes for LEFT breast lump/abscess. Ten days ago, patient noticed a lump and tenderness to her LEFT breast.  The lump is under her nipple and areola and spreads upward above the nipple. It has gotten larger and more painful. The skin is red and warm to touch. She has some white discharge come out of the nipple earlier today. Went to the ER yesterday and they gave her hydrocodone and clindamycin. Past Medical History:   Diagnosis Date    Abnormal chest x-ray     Abnormal Pap smear     Encounter for IUD insertion 03/28/2017    mirena replaced    HPV vaccine counseling     Pt completed Gardasil series    HSV-2 infection     PPD positive        Past Surgical History:   Procedure Laterality Date    HX COLPOSCOPY  12/09    negative    HX HEENT  2015    wisdom tooth extraction       Social History     Social History    Marital status: SINGLE     Spouse name: N/A    Number of children: N/A    Years of education: N/A     Occupational History    Not on file. Social History Main Topics    Smoking status: Never Smoker    Smokeless tobacco: Never Used    Alcohol use No    Drug use: No    Sexual activity: Yes     Partners: Male     Birth control/ protection: IUD     Other Topics Concern    Not on file     Social History Narrative       Current Outpatient Prescriptions on File Prior to Visit   Medication Sig Dispense Refill    clindamycin (CLEOCIN) 300 mg capsule Take 1 Cap by mouth four (4) times daily for 7 days. 28 Cap 0    HYDROcodone-acetaminophen (NORCO) 5-325 mg per tablet Take 1 Tab by mouth every four (4) hours as needed for Pain. Max Daily Amount: 6 Tabs. 20 Tab 0    traZODone (DESYREL) 50 mg tablet Take 1 Tab by mouth nightly. 30 Tab 2    hydroCHLOROthiazide (HYDRODIURIL) 12.5 mg tablet Take 1 Tab by mouth daily.  30 Tab 2    MULTIVIT WITH CALCIUM,IRON,MIN [facility] DAILY MULTIVITAMIN PO) Take  by mouth. Current Facility-Administered Medications on File Prior to Visit   Medication Dose Route Frequency Provider Last Rate Last Dose    [COMPLETED] HYDROcodone-acetaminophen (NORCO) 5-325 mg per tablet 1 Tab  1 Tab Oral ONCE PRN AMBER Murray   1 Tab at 17 1643    [DISCONTINUED] clindamycin (CLEOCIN) capsule 300 mg  300 mg Oral Q6H AMBER Johnson   300 mg at 17 1643       Allergies   Allergen Reactions    Codeine Hives and Itching    Pcn [Penicillins] Hives and Itching       OB History      Para Term  AB Living    3 1 1  2 1    SAB TAB Ectopic Molar Multiple Live Births    1 1            Obstetric Comments    Menarche:  [place]. LMP: IUD. # of Children:  1. Age at Delivery of First Child:  23.   Hysterectomy/oophorectomy:  NO/NO. Breast Bx:  no.  Hx of Breast Feeding:  yes. BCP:  yes. Hormone therapy:  no.           ROS  Constitutional: Negative    HENT: Negative. Eyes: Negative. Respiratory: Negative. Cardiovascular: Negative. Gastrointestinal: Negative. Genitourinary: Negative. Musculoskeletal: Negative. Skin: Negative. Neurological: Negative. Endo/Heme/Allergies: Negative. Psychiatric/Behavioral: Negative. Physical Exam   Cardiovascular: Normal rate and normal heart sounds. Pulmonary/Chest: Breath sounds normal. Right breast exhibits no inverted nipple, no mass, no nipple discharge, no skin change and no tenderness. Left breast exhibits mass and tenderness. Left breast exhibits no inverted nipple, no nipple discharge and no skin change. Breasts are symmetrical.       Lymphadenopathy:        Right cervical: No superficial cervical, no deep cervical and no posterior cervical adenopathy present. Left cervical: No superficial cervical, no deep cervical and no posterior cervical adenopathy present. Right axillary: No pectoral and no lateral adenopathy present.         Left axillary: No pectoral and no lateral adenopathy present. BREAST ULTRASOUND  Indication: Left breast mass retroareolar  Technique: The area was scanned using a high-frequency linear-array near-field transducer  Findings: Hypoechoic collection with acoustic enhancement  Impression: Breast abscess  Disposition: F/u in 1 week    ASSESSMENT and PLAN    ICD-10-CM ICD-9-CM    1. Breast abscess N61.1 611.0      Pt presents with LEFT nipple mass with discomfort, likely related to infected sebaceous cyst. Pt states some drainage occurred this morning, and this was the only occurrence. Abscess seen on US, but site is not ready for I&D. Advised to observe and continue on antibiotics/pain medication. Also advised to apply warm compresses to site to promote self-drainage. F/u in 1 week for reexamination. This plan was reviewed with the patient and patient agrees. All questions were answered.     Written by Damon Nicole, as dictated by Dr. Giovani Walters MD.

## 2017-11-27 NOTE — LETTER
11/27/2017 4:37 PM 
 
Patient:  Wilian Mills YOB: 1990 Date of Visit: 11/27/2017 Dear Dr. Jennifer Cherry: Thank you for referring Ms. Parul Gee to me for evaluation/treatment. Below are the relevant portions of my assessment and plan of care. HISTORY OF PRESENT ILLNESS Wilian Mills is a 32 y.o. female. HPI 
NEW patient consult referred by Dr. Dee Vaca for LEFT breast lump/abscess. Ten days ago, patient noticed a lump and tenderness to her LEFT breast.  The lump is under her nipple and areola and spreads upward above the nipple. It has gotten larger and more painful. The skin is red and warm to touch. She has some white discharge come out of the nipple earlier today. Went to the ER yesterday and they gave her hydrocodone and clindamycin. Past Medical History:  
Diagnosis Date  Abnormal chest x-ray  Abnormal Pap smear  Encounter for IUD insertion 03/28/2017  
 mirena replaced  HPV vaccine counseling Pt completed Gardasil series  HSV-2 infection  PPD positive Past Surgical History:  
Procedure Laterality Date  HX COLPOSCOPY  12/09  
 negative  HX HEENT  2015  
 wisdom tooth extraction Social History Social History  Marital status: SINGLE Spouse name: N/A  
 Number of children: N/A  
 Years of education: N/A Occupational History  Not on file. Social History Main Topics  Smoking status: Never Smoker  Smokeless tobacco: Never Used  Alcohol use No  
 Drug use: No  
 Sexual activity: Yes  
  Partners: Male Birth control/ protection: IUD Other Topics Concern  Not on file Social History Narrative Current Outpatient Prescriptions on File Prior to Visit Medication Sig Dispense Refill  clindamycin (CLEOCIN) 300 mg capsule Take 1 Cap by mouth four (4) times daily for 7 days.  28 Cap 0  
 HYDROcodone-acetaminophen (NORCO) 5-325 mg per tablet Take 1 Tab by mouth every four (4) hours as needed for Pain. Max Daily Amount: 6 Tabs. 20 Tab 0  
 traZODone (DESYREL) 50 mg tablet Take 1 Tab by mouth nightly. 30 Tab 2  
 hydroCHLOROthiazide (HYDRODIURIL) 12.5 mg tablet Take 1 Tab by mouth daily. 30 Tab 2  MULTIVIT WITH CALCIUM,IRON,MIN (WOMEN'S DAILY MULTIVITAMIN PO) Take  by mouth. Current Facility-Administered Medications on File Prior to Visit Medication Dose Route Frequency Provider Last Rate Last Dose  [COMPLETED] HYDROcodone-acetaminophen (NORCO) 5-325 mg per tablet 1 Tab  1 Tab Oral ONCE PRN AMBER Delacruz   1 Tab at 17 1643  [DISCONTINUED] clindamycin (CLEOCIN) capsule 300 mg  300 mg Oral Q6H AMBER Johnson   300 mg at 17 1643 Allergies Allergen Reactions  Codeine Hives and Itching  Pcn [Penicillins] Hives and Itching OB History  Para Term  AB Living 3 1 1  2 1 SAB TAB Ectopic Molar Multiple Live Births 1 1 Obstetric Comments Menarche:  15. LMP: IUD. # of Children:  1. Age at Delivery of First Child:  23.   Hysterectomy/oophorectomy:  NO/NO. Breast Bx:  no.  Hx of Breast Feeding:  yes. BCP:  yes. Hormone therapy:  no.   
  
 
 
ROS Constitutional: Negative HENT: Negative. Eyes: Negative. Respiratory: Negative. Cardiovascular: Negative. Gastrointestinal: Negative. Genitourinary: Negative. Musculoskeletal: Negative. Skin: Negative. Neurological: Negative. Endo/Heme/Allergies: Negative. Psychiatric/Behavioral: Negative. Physical Exam  
Cardiovascular: Normal rate and normal heart sounds. Pulmonary/Chest: Breath sounds normal. Right breast exhibits no inverted nipple, no mass, no nipple discharge, no skin change and no tenderness. Left breast exhibits mass and tenderness. Left breast exhibits no inverted nipple, no nipple discharge and no skin change. Breasts are symmetrical.  
 
 
Lymphadenopathy: Right cervical: No superficial cervical, no deep cervical and no posterior cervical adenopathy present. Left cervical: No superficial cervical, no deep cervical and no posterior cervical adenopathy present. Right axillary: No pectoral and no lateral adenopathy present. Left axillary: No pectoral and no lateral adenopathy present. BREAST ULTRASOUND Indication: Left breast mass retroareolar Technique: The area was scanned using a high-frequency linear-array near-field transducer Findings: Hypoechoic collection with acoustic enhancement Impression: Breast abscess Disposition: F/u in 1 week ASSESSMENT and PLAN 
  ICD-10-CM ICD-9-CM 1. Breast abscess N61.1 611.0 Pt presents with LEFT nipple mass with discomfort, likely related to infected sebaceous cyst. Pt states some drainage occurred this morning, and this was the only occurrence. Abscess seen on US, but site is not ready for I&D. Advised to observe and continue on antibiotics/pain medication. Also advised to apply warm compresses to site to promote self-drainage. F/u in 1 week for reexamination. This plan was reviewed with the patient and patient agrees. All questions were answered. Written by Arelis Botello, as dictated by Dr. Brandi Espinoza MD.  
 
 
 
If you have questions, please do not hesitate to call me. I look forward to following Ms. Palomino along with you.  
 
 
 
Sincerely, 
 
 
Edwar Larson MD

## 2017-11-27 NOTE — PROGRESS NOTES
HISTORY OF PRESENT ILLNESS  Stephanie Rosen is a 32 y.o. female. HPI NEW patient consult referred by Dr. Loco Osullivan for LEFT breast lump/abscess. Ten days ago, patient noticed a lump and tenderness to her LEFT breast.  The lump is under her nipple and areola and spreads upward above the nipple. It has gotten larger and more painful. The skin is red and warm to touch. She has some white discharge come out of the nipple earlier today. Went to the ER yesterday and they gave her hydrocodone and clindamycin. Denies FH of breast or ovarian cancer. Review of Systems   Constitutional: Negative. HENT: Negative. Eyes: Negative. Respiratory: Negative. Cardiovascular: Negative. Gastrointestinal: Negative. Genitourinary: Negative. Musculoskeletal: Negative. Skin: Negative. Neurological: Negative. Endo/Heme/Allergies: Negative. Psychiatric/Behavioral: Negative.         Physical Exam    ASSESSMENT and PLAN  {ASSESSMENT/PLAN:68519}

## 2017-12-04 ENCOUNTER — OFFICE VISIT (OUTPATIENT)
Dept: SURGERY | Age: 27
End: 2017-12-04

## 2017-12-04 VITALS
BODY MASS INDEX: 35.85 KG/M2 | DIASTOLIC BLOOD PRESSURE: 97 MMHG | SYSTOLIC BLOOD PRESSURE: 149 MMHG | WEIGHT: 210 LBS | HEART RATE: 85 BPM | HEIGHT: 64 IN

## 2017-12-04 DIAGNOSIS — N61.1 BREAST ABSCESS OF FEMALE: Primary | ICD-10-CM

## 2017-12-04 RX ORDER — DIPHENHYDRAMINE HCL 25 MG
25 CAPSULE ORAL
COMMUNITY
End: 2019-05-24

## 2017-12-04 NOTE — PROGRESS NOTES
HISTORY OF PRESENT ILLNESS  Stephanie Russ is a 32 y.o. female. HPI  ESTABLISHED patient here for 1 week follow up LEFT breast abscess. Continues to take the antibiotic and not needing the pain pills. The skin of breast is still hard to touch. Continues to have tenderness, 3/10, when touched. The breast has not drained since last week. Past Medical History:   Diagnosis Date    Abnormal chest x-ray     Abnormal Pap smear     Encounter for IUD insertion 2017    mirena replaced    HPV vaccine counseling     Pt completed Gardasil series    HSV-2 infection     PPD positive        Past Surgical History:   Procedure Laterality Date    HX COLPOSCOPY      negative    HX HEENT      wisdom tooth extraction       Social History     Social History    Marital status: SINGLE     Spouse name: N/A    Number of children: N/A    Years of education: N/A     Occupational History    Not on file. Social History Main Topics    Smoking status: Never Smoker    Smokeless tobacco: Never Used    Alcohol use No    Drug use: No    Sexual activity: Yes     Partners: Male     Birth control/ protection: IUD     Other Topics Concern    Not on file     Social History Narrative       Current Outpatient Prescriptions on File Prior to Visit   Medication Sig Dispense Refill    [] clindamycin (CLEOCIN) 300 mg capsule Take 1 Cap by mouth four (4) times daily for 7 days. 28 Cap 0    HYDROcodone-acetaminophen (NORCO) 5-325 mg per tablet Take 1 Tab by mouth every four (4) hours as needed for Pain. Max Daily Amount: 6 Tabs. 20 Tab 0    traZODone (DESYREL) 50 mg tablet Take 1 Tab by mouth nightly. 30 Tab 2    hydroCHLOROthiazide (HYDRODIURIL) 12.5 mg tablet Take 1 Tab by mouth daily. 30 Tab 2    MULTIVIT WITH CALCIUM,IRON,MIN (WOMEN'S DAILY MULTIVITAMIN PO) Take  by mouth. No current facility-administered medications on file prior to visit.         Allergies   Allergen Reactions    Codeine Hives and Itching    Pcn [Penicillins] Hives and Itching       OB History      Para Term  AB Living    3 1 1  2 1    SAB TAB Ectopic Molar Multiple Live Births    1 1            Obstetric Comments    Menarche:  15. LMP: IUD. # of Children:  1. Age at Delivery of First Child:  23.   Hysterectomy/oophorectomy:  NO/NO. Breast Bx:  no.  Hx of Breast Feeding:  yes. BCP:  yes. Hormone therapy:  no.           ROS  Constitutional: Negative    HENT: Negative. Eyes: Negative. Respiratory: Negative. Cardiovascular: Negative. Gastrointestinal: Negative. Genitourinary: Negative. Musculoskeletal: Negative. Skin: Negative. Neurological: Negative. Endo/Heme/Allergies: Negative. Psychiatric/Behavioral: Negative. Physical Exam   Cardiovascular: Normal rate and normal heart sounds. Pulmonary/Chest: Breath sounds normal. Right breast exhibits no inverted nipple, no mass, no nipple discharge, no skin change and no tenderness. Left breast exhibits tenderness. Left breast exhibits no inverted nipple, no mass, no nipple discharge and no skin change. Breasts are symmetrical.       Lymphadenopathy:        Right cervical: No superficial cervical, no deep cervical and no posterior cervical adenopathy present. Left cervical: No superficial cervical, no deep cervical and no posterior cervical adenopathy present. Right axillary: No pectoral and no lateral adenopathy present. Left axillary: No pectoral and no lateral adenopathy present. BREAST ULTRASOUND  Indication: left breast mass  Technique: The area was scanned using a high-frequency linear-array near-field transducer  Findings: hypoechoic collection with acoustic enhancement  Impression: Breast abscess    ASPIRATION OF BREAST CYST  Indication : CYST:  left  nipple  Prep : Alcohol. Anesthesia : Lidocaine with epinephrine, 5 cc  Guidance : Ultrasound guidance.     Yield :  1cc blood tinged fluid was aspirated with an 14 gauge needle. Effect : Cyst not completely aspirated. Tolerance: Pt tolerated the procedure with some discomfort  Diposition:  Continue antibiotics. Follow up if new mass occurs      ASSESSMENT and PLAN  Encounter Diagnoses   Name Primary?  Breast abscess of female Yes      Pt f/u on LEFT breast abscess doing slightly better. I attempted to aspirate the cyst under local anesthetic but was not able to completely aspirate. Patient to continue 10 more days of abx and f/u in 2-3 weeks for re-examination. This plan was reviewed with the patient and patient agrees. All questions were answered.     Written by Sharita Coates, as dictated by Dr. Tejinder Bhagat MD.

## 2017-12-04 NOTE — PROGRESS NOTES
HISTORY OF PRESENT ILLNESS  Stephanie Lindsay is a 32 y.o. female. HPI  ESTABLISHED patient here for 1 week follow up LEFT breast abscess. Continues to take the antibiotic and not needing the pain pills. The skin of breast is still hard to touch. Continues to have tenderness, 3/10, when touched. The breast has not drained since last week.       ROS    Physical Exam    ASSESSMENT and PLAN  {ASSESSMENT/PLAN:[id number]}

## 2017-12-04 NOTE — COMMUNICATION BODY
HISTORY OF PRESENT ILLNESS  Stephanie Singh is a 32 y.o. female. HPI  ESTABLISHED patient here for 1 week follow up LEFT breast abscess. Continues to take the antibiotic and not needing the pain pills. The skin of breast is still hard to touch. Continues to have tenderness, 3/10, when touched. The breast has not drained since last week. Past Medical History:   Diagnosis Date    Abnormal chest x-ray     Abnormal Pap smear     Encounter for IUD insertion 2017    mirena replaced    HPV vaccine counseling     Pt completed Gardasil series    HSV-2 infection     PPD positive        Past Surgical History:   Procedure Laterality Date    HX COLPOSCOPY      negative    HX HEENT      wisdom tooth extraction       Social History     Social History    Marital status: SINGLE     Spouse name: N/A    Number of children: N/A    Years of education: N/A     Occupational History    Not on file. Social History Main Topics    Smoking status: Never Smoker    Smokeless tobacco: Never Used    Alcohol use No    Drug use: No    Sexual activity: Yes     Partners: Male     Birth control/ protection: IUD     Other Topics Concern    Not on file     Social History Narrative       Current Outpatient Prescriptions on File Prior to Visit   Medication Sig Dispense Refill    [] clindamycin (CLEOCIN) 300 mg capsule Take 1 Cap by mouth four (4) times daily for 7 days. 28 Cap 0    HYDROcodone-acetaminophen (NORCO) 5-325 mg per tablet Take 1 Tab by mouth every four (4) hours as needed for Pain. Max Daily Amount: 6 Tabs. 20 Tab 0    traZODone (DESYREL) 50 mg tablet Take 1 Tab by mouth nightly. 30 Tab 2    hydroCHLOROthiazide (HYDRODIURIL) 12.5 mg tablet Take 1 Tab by mouth daily. 30 Tab 2    MULTIVIT WITH CALCIUM,IRON,MIN (WOMEN'S DAILY MULTIVITAMIN PO) Take  by mouth. No current facility-administered medications on file prior to visit.         Allergies   Allergen Reactions    Codeine Hives and Itching    Pcn [Penicillins] Hives and Itching       OB History      Para Term  AB Living    3 1 1  2 1    SAB TAB Ectopic Molar Multiple Live Births    1 1            Obstetric Comments    Menarche:  15. LMP: IUD. # of Children:  1. Age at Delivery of First Child:  23.   Hysterectomy/oophorectomy:  NO/NO. Breast Bx:  no.  Hx of Breast Feeding:  yes. BCP:  yes. Hormone therapy:  no.           ROS  Constitutional: Negative    HENT: Negative. Eyes: Negative. Respiratory: Negative. Cardiovascular: Negative. Gastrointestinal: Negative. Genitourinary: Negative. Musculoskeletal: Negative. Skin: Negative. Neurological: Negative. Endo/Heme/Allergies: Negative. Psychiatric/Behavioral: Negative. Physical Exam   Cardiovascular: Normal rate and normal heart sounds. Pulmonary/Chest: Breath sounds normal. Right breast exhibits no inverted nipple, no mass, no nipple discharge, no skin change and no tenderness. Left breast exhibits tenderness. Left breast exhibits no inverted nipple, no mass, no nipple discharge and no skin change. Breasts are symmetrical.       Lymphadenopathy:        Right cervical: No superficial cervical, no deep cervical and no posterior cervical adenopathy present. Left cervical: No superficial cervical, no deep cervical and no posterior cervical adenopathy present. Right axillary: No pectoral and no lateral adenopathy present. Left axillary: No pectoral and no lateral adenopathy present. BREAST ULTRASOUND  Indication: left breast mass  Technique: The area was scanned using a high-frequency linear-array near-field transducer  Findings: hypoechoic collection with acoustic enhancement  Impression: Breast abscess    ASPIRATION OF BREAST CYST  Indication : CYST:  left  nipple  Prep : Alcohol. Anesthesia : Lidocaine with epinephrine, 5 cc  Guidance : Ultrasound guidance.     Yield :  1cc blood tinged fluid was aspirated with an 14 gauge needle. Effect : Cyst not completely aspirated. Tolerance: Pt tolerated the procedure with some discomfort  Diposition:  Continue antibiotics. Follow up if new mass occurs      ASSESSMENT and PLAN  Encounter Diagnoses   Name Primary?  Breast abscess of female Yes      Pt f/u on LEFT breast abscess doing slightly better. I attempted to aspirate the cyst under local anesthetic but was not able to completely aspirate. Patient to continue 10 more days of abx and f/u in 2-3 weeks for re-examination. This plan was reviewed with the patient and patient agrees. All questions were answered.     Written by Dusty Hanley, as dictated by Dr. Claudeen Kanner, MD.

## 2017-12-18 ENCOUNTER — OFFICE VISIT (OUTPATIENT)
Dept: SURGERY | Age: 27
End: 2017-12-18

## 2017-12-18 VITALS
HEART RATE: 49 BPM | WEIGHT: 210 LBS | SYSTOLIC BLOOD PRESSURE: 147 MMHG | DIASTOLIC BLOOD PRESSURE: 92 MMHG | HEIGHT: 64 IN | BODY MASS INDEX: 35.85 KG/M2

## 2017-12-18 DIAGNOSIS — N61.1 BREAST ABSCESS: Primary | ICD-10-CM

## 2017-12-18 NOTE — PROGRESS NOTES
HISTORY OF PRESENT ILLNESS  Stephanie Duque is a 32 y.o. female. HPI  ESTABLISHED patient here today for follow up LEFT breast cyst. She was seen two weeks ago with pain, knot and swelling and had 1ml of blood aspirated. She was not given any more antibiotics, but her knot and swelling has decreased in size. Pain has completely resolved. Denies any other breast problems a this time. She was started on Clindamycin when symptoms started on 11/26/17 (for 7 days). Past Medical History:   Diagnosis Date    Abnormal chest x-ray     Abnormal Pap smear     Encounter for IUD insertion 03/28/2017    mirena replaced    HPV vaccine counseling     Pt completed Gardasil series    HSV-2 infection     PPD positive        Past Surgical History:   Procedure Laterality Date    HX COLPOSCOPY  12/09    negative    HX HEENT  2015    wisdom tooth extraction       Social History     Social History    Marital status: SINGLE     Spouse name: N/A    Number of children: N/A    Years of education: N/A     Occupational History    Not on file. Social History Main Topics    Smoking status: Never Smoker    Smokeless tobacco: Never Used    Alcohol use No    Drug use: No    Sexual activity: Yes     Partners: Male     Birth control/ protection: IUD     Other Topics Concern    Not on file     Social History Narrative       Current Outpatient Prescriptions on File Prior to Visit   Medication Sig Dispense Refill    diphenhydrAMINE (BENADRYL) 25 mg capsule Take 25 mg by mouth every six (6) hours as needed for Sleep.  traZODone (DESYREL) 50 mg tablet Take 1 Tab by mouth nightly. (Patient taking differently: Take 50 mg by mouth as needed.) 30 Tab 2    hydroCHLOROthiazide (HYDRODIURIL) 12.5 mg tablet Take 1 Tab by mouth daily. 30 Tab 2    MULTIVIT WITH CALCIUM,IRON,MIN (WOMEN'S DAILY MULTIVITAMIN PO) Take  by mouth. No current facility-administered medications on file prior to visit.         Allergies   Allergen Reactions    Codeine Hives and Itching    Pcn [Penicillins] Hives and Itching       OB History      Para Term  AB Living    3 1 1  2 1    SAB TAB Ectopic Molar Multiple Live Births    1 1            Obstetric Comments    Menarche:  15. LMP: IUD. # of Children:  1. Age at Delivery of First Child:  23.   Hysterectomy/oophorectomy:  NO/NO. Breast Bx:  no.  Hx of Breast Feeding:  yes. BCP:  yes. Hormone therapy:  no.           ROS  Constitutional: Negative    HENT: Negative. Eyes: Negative. Respiratory: Negative. Cardiovascular: Negative. Gastrointestinal: Negative. Genitourinary: Negative. Musculoskeletal: Negative. Skin: Negative. Neurological: Negative. Endo/Heme/Allergies: Negative. Psychiatric/Behavioral: Negative. Physical Exam   Cardiovascular: Normal rate and normal heart sounds. Pulmonary/Chest: Breath sounds normal. Right breast exhibits no inverted nipple, no mass, no nipple discharge, no skin change and no tenderness. Left breast exhibits mass and skin change (Peeling, flaky skin surrounding area of former abscess). Left breast exhibits no inverted nipple, no nipple discharge and no tenderness. Breasts are symmetrical.       Lymphadenopathy:        Right cervical: No superficial cervical, no deep cervical and no posterior cervical adenopathy present. Left cervical: No superficial cervical, no deep cervical and no posterior cervical adenopathy present. Right axillary: No pectoral and no lateral adenopathy present. Left axillary: No pectoral and no lateral adenopathy present. ASSESSMENT and PLAN    ICD-10-CM ICD-9-CM    1. Breast abscess N61.1 611.0      Pt here to f/u on LEFT breast abscess and doing well. Mass feels smaller since last exam and area is almost completely healed. Pt will f/u if site becomes inflamed for antibiotic rx. This plan was reviewed with the patient and patient agrees. All questions were answered.     Written by Arelis Botello, as dictated by Dr. Brandi Espinoza MD.

## 2017-12-18 NOTE — PATIENT INSTRUCTIONS
Breast Self-Exam: Care Instructions  Your Care Instructions    A breast self-exam is when you check your breasts for lumps or changes. This regular exam helps you learn how your breasts normally look and feel. Most breast problems or changes are not because of cancer. Breast self-exam is not a substitute for a mammogram. Having regular breast exams by your doctor and regular mammograms improve your chances of finding any problems with your breasts. Some women set a time each month to do a step-by-step breast self-exam. Other women like a less formal system. They might look at their breasts as they brush their teeth, or feel their breasts once in a while in the shower. If you notice a change in your breast, tell your doctor. Follow-up care is a key part of your treatment and safety. Be sure to make and go to all appointments, and call your doctor if you are having problems. It's also a good idea to know your test results and keep a list of the medicines you take. How do you do a breast self-exam?  · The best time to examine your breasts is usually one week after your menstrual period begins. Your breasts should not be tender then. If you do not have periods, you might do your exam on a day of the month that is easy to remember. · To examine your breasts:  ¨ Remove all your clothes above the waist and lie down. When you are lying down, your breast tissue spreads evenly over your chest wall, which makes it easier to feel all your breast tissue. ¨ Use the pads-not the fingertips-of the 3 middle fingers of your left hand to check your right breast. Move your fingers slowly in small coin-sized circles that overlap. ¨ Use three levels of pressure to feel of all your breast tissue. Use light pressure to feel the tissue close to the skin surface. Use medium pressure to feel a little deeper. Use firm pressure to feel your tissue close to your breastbone and ribs.  Use each pressure level to feel your breast tissue before moving on to the next spot. ¨ Check your entire breast, moving up and down as if following a strip from the collarbone to the bra line, and from the armpit to the ribs. Repeat until you have covered the entire breast.  ¨ Repeat this procedure for your left breast, using the pads of the 3 middle fingers of your right hand. · To examine your breasts while in the shower:  ¨ Place one arm over your head and lightly soap your breast on that side. ¨ Using the pads of your fingers, gently move your hand over your breast (in the strip pattern described above), feeling carefully for any lumps or changes. ¨ Repeat for the other breast.  · Have your doctor inspect anything you notice to see if you need further testing. Where can you learn more? Go to http://azael-maritza.info/. Enter P148 in the search box to learn more about \"Breast Self-Exam: Care Instructions. \"  Current as of: May 12, 2017  Content Version: 11.4  © 7610-2544 [employer]wise, [employer]. Care instructions adapted under license by [employer] (which disclaims liability or warranty for this information). If you have questions about a medical condition or this instruction, always ask your healthcare professional. [place] [address] any warranty or liability for your use of this information.

## 2017-12-18 NOTE — PROGRESS NOTES
HISTORY OF PRESENT ILLNESS  Stephanie Stuart is a 32 y.o. female. HPI   ESTABLISHED patient here today for follow up LEFT breast cyst. She was seen two weeks ago with pain, knot and swelling and had 1ml of blood aspirated. She was not given any more antibiotics, but her knot and swelling has decreased in size. Pain has completely resolved. Denies any other breast problems a this time. She was started on Clindamycin when symptoms started on 11/26/17 (for 7 days).        ROS    Physical Exam    ASSESSMENT and PLAN  {ASSESSMENT/PLAN:49580}

## 2017-12-19 NOTE — COMMUNICATION BODY
HISTORY OF PRESENT ILLNESS  Shantel L Avon Phoenix is a 32 y.o. female. HPI  ESTABLISHED patient here today for follow up LEFT breast cyst. She was seen two weeks ago with pain, knot and swelling and had 1ml of blood aspirated. She was not given any more antibiotics, but her knot and swelling has decreased in size. Pain has completely resolved. Denies any other breast problems a this time. She was started on Clindamycin when symptoms started on 11/26/17 (for 7 days). Past Medical History:   Diagnosis Date    Abnormal chest x-ray     Abnormal Pap smear     Encounter for IUD insertion 03/28/2017    mirena replaced    HPV vaccine counseling     Pt completed Gardasil series    HSV-2 infection     PPD positive        Past Surgical History:   Procedure Laterality Date    HX COLPOSCOPY  12/09    negative    HX HEENT  2015    wisdom tooth extraction       Social History     Social History    Marital status: SINGLE     Spouse name: N/A    Number of children: N/A    Years of education: N/A     Occupational History    Not on file. Social History Main Topics    Smoking status: Never Smoker    Smokeless tobacco: Never Used    Alcohol use No    Drug use: No    Sexual activity: Yes     Partners: Male     Birth control/ protection: IUD     Other Topics Concern    Not on file     Social History Narrative       Current Outpatient Prescriptions on File Prior to Visit   Medication Sig Dispense Refill    diphenhydrAMINE (BENADRYL) 25 mg capsule Take 25 mg by mouth every six (6) hours as needed for Sleep.  traZODone (DESYREL) 50 mg tablet Take 1 Tab by mouth nightly. (Patient taking differently: Take 50 mg by mouth as needed.) 30 Tab 2    hydroCHLOROthiazide (HYDRODIURIL) 12.5 mg tablet Take 1 Tab by mouth daily. 30 Tab 2    MULTIVIT WITH CALCIUM,IRON,MIN (WOMEN'S DAILY MULTIVITAMIN PO) Take  by mouth. No current facility-administered medications on file prior to visit.         Allergies   Allergen Reactions    Codeine Hives and Itching    Pcn [Penicillins] Hives and Itching       OB History      Para Term  AB Living    3 1 1  2 1    SAB TAB Ectopic Molar Multiple Live Births    1 1            Obstetric Comments    Menarche:  15. LMP: IUD. # of Children:  1. Age at Delivery of First Child:  23.   Hysterectomy/oophorectomy:  NO/NO. Breast Bx:  no.  Hx of Breast Feeding:  yes. BCP:  yes. Hormone therapy:  no.           ROS  Constitutional: Negative    HENT: Negative. Eyes: Negative. Respiratory: Negative. Cardiovascular: Negative. Gastrointestinal: Negative. Genitourinary: Negative. Musculoskeletal: Negative. Skin: Negative. Neurological: Negative. Endo/Heme/Allergies: Negative. Psychiatric/Behavioral: Negative. Physical Exam   Cardiovascular: Normal rate and normal heart sounds. Pulmonary/Chest: Breath sounds normal. Right breast exhibits no inverted nipple, no mass, no nipple discharge, no skin change and no tenderness. Left breast exhibits mass and skin change (Peeling, flaky skin surrounding area of former abscess). Left breast exhibits no inverted nipple, no nipple discharge and no tenderness. Breasts are symmetrical.       Lymphadenopathy:        Right cervical: No superficial cervical, no deep cervical and no posterior cervical adenopathy present. Left cervical: No superficial cervical, no deep cervical and no posterior cervical adenopathy present. Right axillary: No pectoral and no lateral adenopathy present. Left axillary: No pectoral and no lateral adenopathy present. ASSESSMENT and PLAN    ICD-10-CM ICD-9-CM    1. Breast abscess N61.1 611.0      Pt here to f/u on LEFT breast abscess and doing well. Mass feels smaller since last exam and area is almost completely healed. Pt will f/u if site becomes inflamed for antibiotic rx. This plan was reviewed with the patient and patient agrees. All questions were answered.     Written by Austen Yeh, as dictated by Dr. Estela Renner MD.

## 2018-01-16 DIAGNOSIS — I10 ESSENTIAL HYPERTENSION: ICD-10-CM

## 2018-01-18 RX ORDER — HYDROCHLOROTHIAZIDE 12.5 MG/1
12.5 TABLET ORAL DAILY
Qty: 30 TAB | Refills: 2 | Status: SHIPPED | OUTPATIENT
Start: 2018-01-18 | End: 2018-05-11

## 2018-05-11 ENCOUNTER — OFFICE VISIT (OUTPATIENT)
Dept: FAMILY MEDICINE CLINIC | Age: 28
End: 2018-05-11

## 2018-05-11 VITALS
DIASTOLIC BLOOD PRESSURE: 88 MMHG | SYSTOLIC BLOOD PRESSURE: 149 MMHG | TEMPERATURE: 98.1 F | HEART RATE: 56 BPM | RESPIRATION RATE: 18 BRPM | BODY MASS INDEX: 37.05 KG/M2 | OXYGEN SATURATION: 100 % | HEIGHT: 64 IN | WEIGHT: 217 LBS

## 2018-05-11 DIAGNOSIS — I31.39 PERICARDIAL EFFUSION: ICD-10-CM

## 2018-05-11 DIAGNOSIS — G47.00 INSOMNIA, UNSPECIFIED TYPE: ICD-10-CM

## 2018-05-11 DIAGNOSIS — Z11.3 SCREENING FOR STD (SEXUALLY TRANSMITTED DISEASE): ICD-10-CM

## 2018-05-11 DIAGNOSIS — I10 ESSENTIAL HYPERTENSION: Primary | ICD-10-CM

## 2018-05-11 RX ORDER — LISINOPRIL AND HYDROCHLOROTHIAZIDE 20; 25 MG/1; MG/1
1 TABLET ORAL DAILY
Qty: 30 TAB | Refills: 1 | Status: SHIPPED | OUTPATIENT
Start: 2018-05-11 | End: 2018-06-21 | Stop reason: SDUPTHER

## 2018-05-11 RX ORDER — TRAZODONE HYDROCHLORIDE 50 MG/1
50 TABLET ORAL
Qty: 30 TAB | Refills: 2 | Status: SHIPPED | OUTPATIENT
Start: 2018-05-11 | End: 2018-10-29 | Stop reason: SDUPTHER

## 2018-05-11 NOTE — PROGRESS NOTES
Asiya Stone  32 y.o. female  1990  [address] [facility]  [phone]     [facility] Practice: Progress Note       Encounter Date: 5/11/2018    Chief Complaint   Patient presents with    Elevated Blood Pressure     follow up       History provided by patient  History of Present Illness   Stephanie Harper is a 32 y.o. female who presents to clinic today for:    Hypertension: Uncontrolled   BP Readings from Last 3 Encounters:   05/11/18 149/88   12/18/17 (!) 147/92   12/04/17 (!) 149/97     The patient reports:  taking medications as instructed, no medication side effects noted, no TIA's, no chest pain on exertion, no dyspnea on exertion, no swelling of ankles. Sodium   Date Value Ref Range Status   11/03/2017 140 134 - 144 mmol/L Final     Potassium   Date Value Ref Range Status   11/03/2017 4.0 3.5 - 5.2 mmol/L Final     Chloride   Date Value Ref Range Status   11/03/2017 103 96 - 106 mmol/L Final     Creatinine   Date Value Ref Range Status   11/03/2017 0.81 0.57 - 1.00 mg/dL Final   03/02/2015 0.74 0.57 - 1.00 mg/dL Final     GFR est AA   Date Value Ref Range Status   11/03/2017 115 >59 mL/min/1.73 Final   03/02/2015 131 >59 mL/min/1.73 Final     GFR est non-AA   Date Value Ref Range Status   11/03/2017 100 >59 mL/min/1.73 Final   03/02/2015 114 >59 mL/min/1.73 Final       Our goal is to normalize the blood pressure to decrease the risks of strokes and heart attacks. The patient is in agreement with the plan. Bilateral pleural effusion and pericardial effusion  Patient has SOB/CP and was worked up at [address] [place] [address] for chest pain. Tests included CTA for possible PE, which was negative. However CTA did identify small pericardial effusion, bilateral pleural effusion and several prominent LN in the bilateral axilla. No hilar or mediastinal LN enlarged. Repost that it was recommended to follow up with lupus labs.   FamHx of lupus in first cousin and father has a clotting disorder but she is not certain which. Review of Systems   Review of Systems   Constitutional: Positive for malaise/fatigue. Negative for chills, diaphoresis, fever and weight loss. HENT: Negative for congestion. Respiratory: Negative for cough, hemoptysis and shortness of breath. Cardiovascular: Negative for chest pain and palpitations. Skin: Negative for itching and rash. Neurological: Negative for dizziness, focal weakness, seizures and headaches. Psychiatric/Behavioral: Negative for depression. The patient has insomnia. Vitals/Objective:     Vitals:    05/11/18 0824 05/11/18 0846   BP: (!) 151/101 149/88   Pulse: (!) 53 (!) 56   Resp: 18    Temp: 98.1 °F (36.7 °C)    TempSrc: Oral    SpO2: 100%    Weight: 217 lb (98.4 kg)    Height: 5' 4\" (1.626 m)      Body mass index is 37.25 kg/(m^2). Wt Readings from Last 3 Encounters:   05/11/18 217 lb (98.4 kg)   12/18/17 210 lb (95.3 kg)   12/04/17 210 lb (95.3 kg)       Physical Exam   Constitutional: She is oriented to person, place, and time. She appears well-developed and well-nourished. HENT:   Head: Normocephalic and atraumatic. Cardiovascular: Regular rhythm. Bradycardia present. Neurological: She is alert and oriented to person, place, and time. No results found for this or any previous visit (from the past 24 hour(s)). Assessment and Plan:     Encounter Diagnoses     ICD-10-CM ICD-9-CM   1. Essential hypertension I10 401.9   2. Pericardial effusion I31.3 423.9   3. Insomnia, unspecified type G47.00 780.52   4. Screening for STD (sexually transmitted disease) Z11.3 V74.5       1. Essential hypertension  Patient has hx of snoring and had been referred for sleep studay but she did not complete. Will send request to [facility]. - Increase HCTZ anf start lisinopril.   - lisinopril-hydroCHLOROthiazide (PRINZIDE, ZESTORETIC) 20-25 mg per tablet; Take 1 Tab by mouth daily. Dispense: 30 Tab;  Refill: 1  - METABOLIC PANEL, COMPREHENSIVE    2. Pericardial effusion  Records printed from [facility] correlate with what the patient relayed to me. - CHANDA W/REFLEX  - SED RATE (ESR)  - CBC WITH AUTOMATED DIFF  - ANTIPHOSPHOLIPID SYNDROME PANEL  - COMPLEMENT, C3  - COMPLEMENT, C4    3. Insomnia, unspecified type  - traZODone (DESYREL) 50 mg tablet; Take 1 Tab by mouth nightly as needed for Sleep. Dispense: 30 Tab; Refill: 2    4. Screening for STD (sexually transmitted disease)  - HIV 1/2 AG/AB, 4TH GENERATION,W RFLX CONFIRM    I have discussed the diagnosis with the patient and the intended plan as seen in the above orders. she has expressed understanding. The patient has received an after-visit summary and questions were answered concerning future plans. I have discussed medication side effects and warnings with the patient as well. Electronically Signed: Crystal Pressley MD     History/Allergies   Patients past medical, surgical and family histories were reviewed and updated. Past Medical History:   Diagnosis Date    Abnormal chest x-ray     Abnormal Pap smear     Encounter for IUD insertion 03/28/2017    mirena replaced    HPV vaccine counseling     Pt completed Gardasil series    HSV-2 infection     PPD positive       Past Surgical History:   Procedure Laterality Date    HX COLPOSCOPY  12/09    negative    HX HEENT  2015    wisdom tooth extraction     Family History   Problem Relation Age of Onset    Anemia Mother     Sickle Cell Anemia Sister     Sickle Cell Trait Sister     Hypertension Father     Sickle Cell Trait Father     Clotting Disorder Father     Hypertension Paternal Grandmother     Lupus Other      Social History     Social History    Marital status: SINGLE     Spouse name: N/A    Number of children: N/A    Years of education: N/A     Occupational History    Not on file.      Social History Main Topics    Smoking status: Never Smoker    Smokeless tobacco: Never Used    Alcohol use No    Drug use: No    Sexual activity: Yes     Partners: Male     Birth control/ protection: IUD     Other Topics Concern    Not on file     Social History Narrative         Allergies   Allergen Reactions    Codeine Hives and Itching    Pcn [Penicillins] Hives and Itching       Disposition     Follow-up Disposition:  Return in about 4 weeks (around 6/8/2018) for BP. Future Appointments  Date Time Provider Nik Sofi   6/1/2018 9:30 AM Topher Alonzo MD [address] [place] [address]            Current Medications after this visit     Current Outpatient Prescriptions   Medication Sig    lisinopril-hydroCHLOROthiazide (PRINZIDE, ZESTORETIC) 20-25 mg per tablet Take 1 Tab by mouth daily.  traZODone (DESYREL) 50 mg tablet Take 1 Tab by mouth nightly as needed for Sleep.  levonorgestrel (MIRENA) 20 mcg/24 hr (5 years) IUD 1 Device by IntraUTERine route once.  diphenhydrAMINE (BENADRYL) 25 mg capsule Take 25 mg by mouth every six (6) hours as needed for Sleep.  MULTIVIT WITH CALCIUM,IRON,MIN (WOMEN'S DAILY MULTIVITAMIN PO) Take  by mouth. No current facility-administered medications for this visit.       Medications Discontinued During This Encounter   Medication Reason    hydroCHLOROthiazide (HYDRODIURIL) 12.5 mg tablet Not A Current Medication    traZODone (DESYREL) 50 mg tablet Reorder

## 2018-05-11 NOTE — MR AVS SNAPSHOT
[address] [address] 
[phone] Patient: George Sharpe MRN: [id number] KPX:[id number] Visit Information Date & Time Provider Department Dept. Phone Encounter #  
 5/11/2018  8:20 AM Trish Garcia MD [address] [place] [address] [id number] Follow-up Instructions Return in about 4 weeks (around 6/8/2018) for BP. Your Appointments 6/1/2018  9:30 AM  
ESTABLISHED PATIENT with Dulce Maria Maciel MD  
[facility] ([facility]) Appt Note: AE/STD testing [address] [facility] [address] [facility] [id number]  
[facility] [address] Upcoming Health Maintenance Date Due DTaP/Tdap/Td series (1 - Tdap) 6/4/2011 Influenza Age 5 to Adult 8/1/2018 PAP AKA CERVICAL CYTOLOGY 5/5/2020 Allergies as of 5/11/2018  Review Complete On: 5/11/2018 By: Kamran Ba LPN Severity Noted Reaction Type Reactions Codeine  05/10/2010    Hives, Itching Pcn [Penicillins]  05/10/2010    Hives, Itching Current Immunizations  Never Reviewed Name Date HPV (Quad) 7/2/2015 11:14 AM, 2/24/2015 Influenza Vaccine (Quad) PF 11/3/2017 Not reviewed this visit You Were Diagnosed With   
  
 Codes Comments Essential hypertension     ICD-10-CM: I10 
ICD-9-CM: 401.9 Pericardial effusion     ICD-10-CM: I31.3 ICD-9-CM: 423.9 Vitals BP Pulse Temp Resp Height(growth percentile) Weight(growth percentile) (!) 151/101 (!) 53 98.1 °F (36.7 °C) (Oral) 18 5' 4\" (1.626 m) 217 lb (98.4 kg) LMP SpO2 BMI OB Status Smoking Status 05/05/2018 100% 37.25 kg/m2 IUD Never Smoker Vitals History BMI and BSA Data Body Mass Index Body Surface Area  
 37.25 kg/m 2 2.11 m 2 Preferred Pharmacy Pharmacy Name Phone [address] [place], [place] - [address] [place] [address] [phone] Your Updated Medication List  
  
   
This list is accurate as of 5/11/18  8:43 AM.  Always use your most recent med list.  
  
  
  
  
 BENADRYL 25 mg capsule Generic drug:  diphenhydrAMINE Take 25 mg by mouth every six (6) hours as needed for Sleep.  
  
 lisinopril-hydroCHLOROthiazide 20-25 mg per tablet Commonly known as:  Gweneth Rasher Take 1 Tab by mouth daily. MIRENA 20 mcg/24 hr (5 years) Iud  
Generic drug:  levonorgestrel 1 Device by IntraUTERine route once. traZODone 50 mg tablet Commonly known as:  Katya Dart Take 1 Tab by mouth nightly. WOMEN'S DAILY MULTIVITAMIN PO Take  by mouth. Prescriptions Sent to Pharmacy Refills  
 lisinopril-hydroCHLOROthiazide (PRINZIDE, ZESTORETIC) 20-25 mg per tablet 1 Sig: Take 1 Tab by mouth daily. Class: Normal  
 Pharmacy: [facility] #: [phone] Route: Oral  
  
We Performed the Following CHANDA W/REFLEX [72661 CPT(R)] ANTIPHOSPHOLIPID SYNDROME PANEL [[id number] Custom] CBC WITH AUTOMATED DIFF [45573 CPT(R)] COMPLEMENT, C3 [id number] CPT(R)] COMPLEMENT, C4 [id number] CPT(R)] METABOLIC PANEL, COMPREHENSIVE [01357 CPT(R)] SED RATE (ESR) [id number] CPT(R)] Follow-up Instructions Return in about 4 weeks (around 6/8/2018) for BP. Introducing [facility]! Dear Jaron Quarles: Thank you for requesting a [employer] account. Our records indicate that you already have an active [employer] account. You can access your account anytime at [email or web address] Did you know that you can access your hospital and ER discharge instructions at any time in [employer]? You can also review all of your test results from your hospital stay or ER visit. Additional Information If you have questions, please visit the Frequently Asked Questions section of the [employer] website at [email or web address] Remember, [employer] is NOT to be used for urgent needs. For medical emergencies, dial 911. Now available from your iPhone and Android! Please provide this summary of care documentation to your next provider. Your primary care clinician is listed as Phys Other. If you have any questions after today's visit, please call [phone].

## 2018-05-11 NOTE — PROGRESS NOTES
1. Have you been to the ER, urgent care clinic, or been hospitalized since your last visit? No     2. Have you seen or consulted any other health care providers outside of the [facility] since your last visit?   No     Reviewed record in preparation for visit and have necessary documentation  opportunity was given for questions  Goals that were addressed and/or need to be completed during or after this appointment include     Health Maintenance Due   Topic Date Due    DTaP/Tdap/Td series (1 - Tdap) 06/04/2011

## 2018-05-17 ENCOUNTER — TELEPHONE (OUTPATIENT)
Dept: FAMILY MEDICINE CLINIC | Age: 28
End: 2018-05-17

## 2018-05-17 LAB
ALBUMIN SERPL-MCNC: 4 G/DL (ref 3.5–5.5)
ALBUMIN/GLOB SERPL: 1.5 {RATIO} (ref 1.2–2.2)
ALP SERPL-CCNC: 65 IU/L (ref 39–117)
ALT SERPL-CCNC: 16 IU/L (ref 0–32)
ANA SER QL: NEGATIVE
APTT HEX PL PPP: 7 SEC
APTT IMM NP PPP: NORMAL SEC
APTT PPP 1:1 SALINE: NORMAL SEC
APTT PPP: 27.6 SEC
AST SERPL-CCNC: 24 IU/L (ref 0–40)
B2 GLYCOPROT1 IGA SER-ACNC: <10 SAU
B2 GLYCOPROT1 IGG SER-ACNC: <10 SGU
B2 GLYCOPROT1 IGM SER-ACNC: <10 SMU
BASOPHILS # BLD AUTO: 0 X10E3/UL (ref 0–0.2)
BASOPHILS NFR BLD AUTO: 0 %
BILIRUB SERPL-MCNC: 0.2 MG/DL (ref 0–1.2)
BUN SERPL-MCNC: 15 MG/DL (ref 6–20)
BUN/CREAT SERPL: 18 (ref 9–23)
C3 SERPL-MCNC: 159 MG/DL (ref 82–167)
C4 SERPL-MCNC: 33 MG/DL (ref 14–44)
CALCIUM SERPL-MCNC: 8.8 MG/DL (ref 8.7–10.2)
CARDIOLIPIN IGA SER IA-ACNC: <10 APL
CARDIOLIPIN IGG SER IA-ACNC: <10 GPL
CARDIOLIPIN IGM SER IA-ACNC: <10 MPL
CHLORIDE SERPL-SCNC: 100 MMOL/L (ref 96–106)
CO2 SERPL-SCNC: 20 MMOL/L (ref 18–29)
CONFIRM DRVVT: NORMAL SEC
CREAT SERPL-MCNC: 0.83 MG/DL (ref 0.57–1)
DRVVT SCREEN TO CONFIRM RATIO: NORMAL RATIO
EOSINOPHIL # BLD AUTO: 0.1 X10E3/UL (ref 0–0.4)
EOSINOPHIL NFR BLD AUTO: 1 %
ERYTHROCYTE [DISTWIDTH] IN BLOOD BY AUTOMATED COUNT: 15.1 % (ref 12.3–15.4)
ERYTHROCYTE [SEDIMENTATION RATE] IN BLOOD BY WESTERGREN METHOD: 20 MM/HR (ref 0–32)
GFR SERPLBLD CREATININE-BSD FMLA CKD-EPI: 112 ML/MIN/1.73
GFR SERPLBLD CREATININE-BSD FMLA CKD-EPI: 97 ML/MIN/1.73
GLOBULIN SER CALC-MCNC: 2.7 G/DL (ref 1.5–4.5)
GLUCOSE SERPL-MCNC: 92 MG/DL (ref 65–99)
HCT VFR BLD AUTO: 40.2 % (ref 34–46.6)
HGB BLD-MCNC: 13.1 G/DL (ref 11.1–15.9)
HIV 1+2 AB+HIV1 P24 AG SERPL QL IA: NON REACTIVE
IMM GRANULOCYTES # BLD: 0 X10E3/UL (ref 0–0.1)
IMM GRANULOCYTES NFR BLD: 0 %
LA NT PLATELET PPP: 0 SEC
LA PPP-IMP: NORMAL
LYMPHOCYTES # BLD AUTO: 1.7 X10E3/UL (ref 0.7–3.1)
LYMPHOCYTES NFR BLD AUTO: 25 %
MCH RBC QN AUTO: 26.2 PG (ref 26.6–33)
MCHC RBC AUTO-ENTMCNC: 32.6 G/DL (ref 31.5–35.7)
MCV RBC AUTO: 80 FL (ref 79–97)
MONOCYTES # BLD AUTO: 0.5 X10E3/UL (ref 0.1–0.9)
MONOCYTES NFR BLD AUTO: 8 %
NEUTROPHILS # BLD AUTO: 4.4 X10E3/UL (ref 1.4–7)
NEUTROPHILS NFR BLD AUTO: 66 %
PLATELET # BLD AUTO: 302 X10E3/UL (ref 150–379)
POTASSIUM SERPL-SCNC: 4.2 MMOL/L (ref 3.5–5.2)
PROT SERPL-MCNC: 6.7 G/DL (ref 6–8.5)
PROTHROM IGG SERPL-ACNC: 2 G UNITS
PS IGG SER IA-ACNC: 0 GPS
PS IGM SER IA-ACNC: 12 MPS
RBC # BLD AUTO: 5 X10E6/UL (ref 3.77–5.28)
SCREEN DRVVT: 34.2 SEC
SODIUM SERPL-SCNC: 138 MMOL/L (ref 134–144)
WBC # BLD AUTO: 6.7 X10E3/UL (ref 3.4–10.8)

## 2018-05-17 NOTE — TELEPHONE ENCOUNTER
Left message for patient. \"Your lab work is all normal. None of the markers for rheumatologic disorders such as lupus were positive. Therefore it is unlikely at this time that you have lupus. Regarding the pericardial and pleural effusions, if you are not having symptoms anymore such as the chest pain, you may not need further testing. Or if you would like to check for resolution of the effusions, I can order either an echocardiogram or CT.  \"    Anne-Marie Pepper MD

## 2018-06-01 ENCOUNTER — OFFICE VISIT (OUTPATIENT)
Dept: OBGYN CLINIC | Age: 28
End: 2018-06-01

## 2018-06-01 VITALS
BODY MASS INDEX: 37.05 KG/M2 | WEIGHT: 217 LBS | DIASTOLIC BLOOD PRESSURE: 66 MMHG | HEIGHT: 64 IN | SYSTOLIC BLOOD PRESSURE: 106 MMHG

## 2018-06-01 DIAGNOSIS — Z11.3 SCREENING EXAMINATION FOR VENEREAL DISEASE: ICD-10-CM

## 2018-06-01 DIAGNOSIS — Z01.419 ENCOUNTER FOR GYNECOLOGICAL EXAMINATION WITHOUT ABNORMAL FINDING: Primary | ICD-10-CM

## 2018-06-01 PROBLEM — E66.01 SEVERE OBESITY (BMI 35.0-39.9): Status: ACTIVE | Noted: 2018-06-01

## 2018-06-01 NOTE — PATIENT INSTRUCTIONS
Breast Self-Exam: Care Instructions  Your Care Instructions    A breast self-exam is when you check your breasts for lumps or changes. This regular exam helps you learn how your breasts normally look and feel. Most breast problems or changes are not because of cancer. Breast self-exam is not a substitute for a mammogram. Having regular breast exams by your doctor and regular mammograms improve your chances of finding any problems with your breasts. Some women set a time each month to do a step-by-step breast self-exam. Other women like a less formal system. They might look at their breasts as they brush their teeth, or feel their breasts once in a while in the shower. If you notice a change in your breast, tell your doctor. Follow-up care is a key part of your treatment and safety. Be sure to make and go to all appointments, and call your doctor if you are having problems. It's also a good idea to know your test results and keep a list of the medicines you take. How do you do a breast self-exam?  · The best time to examine your breasts is usually one week after your menstrual period begins. Your breasts should not be tender then. If you do not have periods, you might do your exam on a day of the month that is easy to remember. · To examine your breasts:  ¨ Remove all your clothes above the waist and lie down. When you are lying down, your breast tissue spreads evenly over your chest wall, which makes it easier to feel all your breast tissue. ¨ Use the pads-not the fingertips-of the 3 middle fingers of your left hand to check your right breast. Move your fingers slowly in small coin-sized circles that overlap. ¨ Use three levels of pressure to feel of all your breast tissue. Use light pressure to feel the tissue close to the skin surface. Use medium pressure to feel a little deeper. Use firm pressure to feel your tissue close to your breastbone and ribs.  Use each pressure level to feel your breast tissue before moving on to the next spot. ¨ Check your entire breast, moving up and down as if following a strip from the collarbone to the bra line, and from the armpit to the ribs. Repeat until you have covered the entire breast.  ¨ Repeat this procedure for your left breast, using the pads of the 3 middle fingers of your right hand. · To examine your breasts while in the shower:  ¨ Place one arm over your head and lightly soap your breast on that side. ¨ Using the pads of your fingers, gently move your hand over your breast (in the strip pattern described above), feeling carefully for any lumps or changes. ¨ Repeat for the other breast.  · Have your doctor inspect anything you notice to see if you need further testing. Where can you learn more? Go to http://azael-maritza.info/. Enter P148 in the search box to learn more about \"Breast Self-Exam: Care Instructions. \"  Current as of: May 12, 2017  Content Version: 11.4  © 5242-5141 [employer]wise, [employer]. Care instructions adapted under license by [employer] (which disclaims liability or warranty for this information). If you have questions about a medical condition or this instruction, always ask your healthcare professional. [place] [address] any warranty or liability for your use of this information.

## 2018-06-01 NOTE — PROGRESS NOTES
Olamide Lovelace is a ,  32 y.o. female [address] whose Patient's last menstrual period was 2018. who presents for her annual checkup. She is having no significant problems. She would like std testing today. With regard to the Gardisil vaccine, she has received all 3 injections. Menstrual status:    Her periods are minimal to nonexistent in flow. She is using essentially none pads or tampons per day, minimal to none using Mirena. She denies dysmenorrhea. She reports no premenstrual symptoms. Contraception:    The current method of family planning is IUD and She declines contraception and counseling. Sexual history:    She  reports that she currently engages in sexual activity and has had male partners. She reports using the following method of birth control/protection: IUD. Medical conditions:    Since her last annual GYN exam about 5/15/2017 ago, she has not the following changes in her health history: none. Pap and Mammogram History:    Her most recent Pap smear was normal obtained 5/15/2017 year(s) ago. The patient has never had a mammogram.    The patient does not have a family history of breast cancer. Past Medical History:   Diagnosis Date    Abnormal chest x-ray     Abnormal Pap smear     Encounter for IUD insertion 2017    mirena replaced    HPV vaccine counseling     Pt completed Gardasil series    HSV-2 infection     PPD positive      Past Surgical History:   Procedure Laterality Date    HX COLPOSCOPY      negative    HX HEENT      wisdom tooth extraction       Current Outpatient Prescriptions   Medication Sig Dispense Refill    lisinopril-hydroCHLOROthiazide (PRINZIDE, ZESTORETIC) 20-25 mg per tablet Take 1 Tab by mouth daily. 30 Tab 1    traZODone (DESYREL) 50 mg tablet Take 1 Tab by mouth nightly as needed for Sleep. 30 Tab 2    levonorgestrel (MIRENA) 20 mcg/24 hr (5 years) IUD 1 Device by IntraUTERine route once.  diphenhydrAMINE (BENADRYL) 25 mg capsule Take 25 mg by mouth every six (6) hours as needed for Sleep.  MULTIVIT WITH CALCIUM,IRON,MIN (WOMEN'S DAILY MULTIVITAMIN PO) Take  by mouth. Allergies: Codeine and Pcn [penicillins]   Social History     Social History    Marital status: SINGLE     Spouse name: N/A    Number of children: N/A    Years of education: N/A     Occupational History    Not on file. Social History Main Topics    Smoking status: Never Smoker    Smokeless tobacco: Never Used    Alcohol use No    Drug use: No    Sexual activity: Yes     Partners: Male     Birth control/ protection: IUD     Other Topics Concern    Not on file     Social History Narrative     Tobacco History:  reports that she has never smoked. She has never used smokeless tobacco.  Alcohol Abuse:  reports that she does not drink alcohol. Drug Abuse:  reports that she does not use illicit drugs.     Patient Active Problem List   Diagnosis Code    Essential hypertension I10       Review of Systems - History obtained from the patient  Constitutional: negative for weight loss, fever, night sweats  HEENT: negative for hearing loss, earache, congestion, snoring, sorethroat  CV: negative for chest pain, palpitations, edema  Resp: negative for cough, shortness of breath, wheezing  GI: negative for change in bowel habits, abdominal pain, black or bloody stools  : negative for frequency, dysuria, hematuria, vaginal discharge  MSK: negative for back pain, joint pain, muscle pain  Breast: negative for breast lumps, nipple discharge, galactorrhea  Skin :negative for itching, rash, hives  Neuro: negative for dizziness, headache, confusion, weakness  Psych: negative for anxiety, depression, change in mood  Heme/lymph: negative for bleeding, bruising, pallor    Physical Exam    Visit Vitals    /66    Ht 5' 4\" (1.626 m)    Wt 217 lb (98.4 kg)    LMP 05/05/2018    BMI 37.25 kg/m2 Constitutional  · Appearance: well-nourished, well developed, alert, in no acute distress    HENT  · Head and Face: appears normal    Neck  · Inspection/Palpation: normal appearance, no masses or tenderness  · Lymph Nodes: no lymphadenopathy present  · Thyroid: gland size normal, nontender, no nodules or masses present on palpation    Chest  · Respiratory Effort: breathing normal  · Auscultation: normal breath sounds    Cardiovascular  · Heart:  · Auscultation: regular rate and rhythm without murmur    Breasts  · Inspection of Breasts: breasts symmetrical, no skin changes, no discharge present, nipple appearance normal, no skin retraction present  · Palpation of Breasts and Axillae: no masses present on palpation, no breast tenderness  · Axillary Lymph Nodes: no lymphadenopathy present    Gastrointestinal  · Abdominal Examination: abdomen non-tender to palpation, normal bowel sounds, no masses present  · Liver and spleen: no hepatomegaly present, spleen not palpable  · Hernias: no hernias identified    Genitourinary  · External Genitalia: normal appearance for age, no discharge present, no tenderness present, no inflammatory lesions present, no masses present, no atrophy present  · Vagina: normal vaginal vault without central or paravaginal defects, no discharge present, no inflammatory lesions present, no masses present  · Bladder: non-tender to palpation  · Urethra: appears normal  · Cervix: normal, string seen   · Uterus: normal size, shape and consistency  · Adnexa: no adnexal tenderness present, no adnexal masses present  · Perineum: perineum within normal limits, no evidence of trauma, no rashes or skin lesions present  · Anus: anus within normal limits, no hemorrhoids present  · Inguinal Lymph Nodes: no lymphadenopathy present    Skin  · General Inspection: no rash, no lesions identified    Neurologic/Psychiatric  · Mental Status:  · Orientation: grossly oriented to person, place and time  · Mood and Affect: mood normal, affect appropriate    . Assessment:  Routine gynecologic examination  Her current medical status is satisfactory with no evidence of significant gynecologic issues.     Plan:  Counseled re: diet, exercise, healthy lifestyle  Return for yearly wellness visits  Wants Hep B, Hep C, syphilis - had others done at [facility]

## 2018-06-02 LAB
COMMENT, 144067: NORMAL
HBV SURFACE AG SERPL QL IA: NEGATIVE
HCV AB S/CO SERPL IA: <0.1 S/CO RATIO (ref 0–0.9)
RPR SER QL: NON REACTIVE

## 2018-06-03 LAB
C TRACH RRNA SPEC QL NAA+PROBE: NEGATIVE
N GONORRHOEA RRNA SPEC QL NAA+PROBE: NEGATIVE
T VAGINALIS RRNA SPEC QL NAA+PROBE: POSITIVE

## 2018-06-04 RX ORDER — METRONIDAZOLE 500 MG/1
2000 TABLET ORAL ONCE
Qty: 4 TAB | Refills: 0 | Status: SHIPPED | OUTPATIENT
Start: 2018-06-04 | End: 2018-06-04

## 2018-06-20 ENCOUNTER — TELEPHONE (OUTPATIENT)
Dept: FAMILY MEDICINE CLINIC | Age: 28
End: 2018-06-20

## 2018-06-20 NOTE — TELEPHONE ENCOUNTER
Attempted to call. No answer. Message left. Please advise sleep study was normal. No further testing or orders needed at this time.

## 2018-06-20 NOTE — TELEPHONE ENCOUNTER
----- Message from Amor Adair sent at 6/20/2018  9:32 AM EDT -----  Regarding: Hlavac/telephone  Pt is requesting the sleep study results. Pts number is [phone].

## 2018-06-21 DIAGNOSIS — I10 ESSENTIAL HYPERTENSION: ICD-10-CM

## 2018-06-21 RX ORDER — LISINOPRIL AND HYDROCHLOROTHIAZIDE 20; 25 MG/1; MG/1
TABLET ORAL
Qty: 30 TAB | Refills: 0 | Status: SHIPPED | OUTPATIENT
Start: 2018-06-21 | End: 2018-08-07 | Stop reason: SINTOL

## 2018-08-03 ENCOUNTER — TELEPHONE (OUTPATIENT)
Dept: FAMILY MEDICINE CLINIC | Age: 28
End: 2018-08-03

## 2018-08-03 NOTE — TELEPHONE ENCOUNTER
----- Message from Fe Garcia sent at 8/3/2018  9:15 AM EDT -----  Regarding: Dr. Oral Moore telephone  Pt requesting a call back in regards to blood pressure medication that she was prescribed that's making her cough. Pt would like to be prescribed something different. Pt best contact number is [phone].

## 2018-08-07 ENCOUNTER — OFFICE VISIT (OUTPATIENT)
Dept: FAMILY MEDICINE CLINIC | Age: 28
End: 2018-08-07

## 2018-08-07 VITALS
RESPIRATION RATE: 16 BRPM | HEART RATE: 61 BPM | WEIGHT: 221 LBS | TEMPERATURE: 98.4 F | BODY MASS INDEX: 37.73 KG/M2 | SYSTOLIC BLOOD PRESSURE: 148 MMHG | DIASTOLIC BLOOD PRESSURE: 93 MMHG | HEIGHT: 64 IN | OXYGEN SATURATION: 99 %

## 2018-08-07 DIAGNOSIS — I10 ESSENTIAL HYPERTENSION: Primary | ICD-10-CM

## 2018-08-07 RX ORDER — LOSARTAN POTASSIUM AND HYDROCHLOROTHIAZIDE 12.5; 5 MG/1; MG/1
1 TABLET ORAL DAILY
Qty: 30 TAB | Refills: 1 | Status: SHIPPED | OUTPATIENT
Start: 2018-08-07 | End: 2018-09-17 | Stop reason: DRUGHIGH

## 2018-08-07 NOTE — PATIENT INSTRUCTIONS

## 2018-08-07 NOTE — MR AVS SNAPSHOT
Tiffanie Lorena 
 
 
 2005 [address] [address] 
[phone] Patient: Eugene Bueno MRN: [id number] [id number] Visit Information Date & Time Provider Department Dept. Phone Encounter #  
 8/7/2018  8:40 AM Migdalia Rodriguez MD Antonio Hummel [id number] Follow-up Instructions Return in about 4 weeks (around 9/4/2018) for Routine with blood work. Upcoming Health Maintenance Date Due DTaP/Tdap/Td series (1 - Tdap) 6/4/2011 Influenza Age 5 to Adult 8/1/2018 PAP AKA CERVICAL CYTOLOGY 5/5/2020 Allergies as of 8/7/2018  Review Complete On: 8/7/2018 By: Migdalia Rodriguez MD  
  
 Severity Noted Reaction Type Reactions Codeine  05/10/2010    Hives, Itching Lisinopril-hydrochlorothiazide  08/07/2018    Cough Patient states she cannot take because she could not stop coughing. Pcn [Penicillins]  05/10/2010    Hives, Itching Current Immunizations  Never Reviewed Name Date HPV (Quad) 7/2/2015 11:14 AM, 2/24/2015 Influenza Vaccine (Quad) PF 11/3/2017 Not reviewed this visit You Were Diagnosed With   
  
 Codes Comments Essential hypertension    -  Primary ICD-10-CM: I10 
ICD-9-CM: 401.9 Vitals BP Pulse Temp Resp Height(growth percentile) Weight(growth percentile) (!) 148/93 (BP 1 Location: Right arm, BP Patient Position: Sitting) 61 98.4 °F (36.9 °C) (Oral) 16 5' 4\" (1.626 m) 221 lb (100.2 kg) SpO2 BMI OB Status Smoking Status 99% 37.93 kg/m2 IUD Never Smoker BMI and BSA Data Body Mass Index Body Surface Area  
 37.93 kg/m 2 2.13 m 2 Preferred Pharmacy Pharmacy Name Phone [address] [place], [place] - [phone] Your Updated Medication List  
  
   
This list is accurate as of 8/7/18  8:59 AM.  Always use your most recent med list.  
  
  
  
  
 BENADRYL 25 mg capsule Generic drug:  diphenhydrAMINE Take 25 mg by mouth every six (6) hours as needed for Sleep.  
  
 losartan-hydroCHLOROthiazide 50-12.5 mg per tablet Commonly known as:  HYZAAR Take 1 Tab by mouth daily. MIRENA 20 mcg/24 hr (5 years) Iud  
Generic drug:  levonorgestrel 1 Device by IntraUTERine route once. traZODone 50 mg tablet Commonly known as:  [place] Take 1 Tab by mouth nightly as needed for Sleep. Prescriptions Sent to Pharmacy Refills  
 losartan-hydroCHLOROthiazide (HYZAAR) 50-12.5 mg per tablet 1 Sig: Take 1 Tab by mouth daily. Class: Normal  
 Pharmacy: [address] [place] [address] #: [phone] Route: Oral  
  
Follow-up Instructions Return in about 4 weeks (around 9/4/2018) for Routine with blood work. Patient Instructions DASH Diet: Care Instructions Your Care Instructions The DASH diet is an eating plan that can help lower your blood pressure. DASH stands for Dietary Approaches to Stop Hypertension. Hypertension is high blood pressure. The DASH diet focuses on eating foods that are high in calcium, potassium, and magnesium. These nutrients can lower blood pressure. The foods that are highest in these nutrients are fruits, vegetables, low-fat dairy products, nuts, seeds, and legumes. But taking calcium, potassium, and magnesium supplements instead of eating foods that are high in those nutrients does not have the same effect. The DASH diet also includes whole grains, fish, and poultry. The DASH diet is one of several lifestyle changes your doctor may recommend to lower your high blood pressure. Your doctor may also want you to decrease the amount of sodium in your diet. Lowering sodium while following the DASH diet can lower blood pressure even further than just the DASH diet alone. Follow-up care is a key part of your treatment and safety.  Be sure to make and go to all appointments, and call your doctor if you are having problems. It's also a good idea to know your test results and keep a list of the medicines you take. How can you care for yourself at home? Following the DASH diet · Eat 4 to 5 servings of fruit each day. A serving is 1 medium-sized piece of fruit, ½ cup chopped or canned fruit, 1/4 cup dried fruit, or 4 ounces (½ cup) of fruit juice. Choose fruit more often than fruit juice. · Eat 4 to 5 servings of vegetables each day. A serving is 1 cup of lettuce or raw leafy vegetables, ½ cup of chopped or cooked vegetables, or 4 ounces (½ cup) of vegetable juice. Choose vegetables more often than vegetable juice. · Get 2 to 3 servings of low-fat and fat-free dairy each day. A serving is 8 ounces of milk, 1 cup of yogurt, or 1 ½ ounces of cheese. · Eat 6 to 8 servings of grains each day. A serving is 1 slice of bread, 1 ounce of dry cereal, or ½ cup of cooked rice, pasta, or cooked cereal. Try to choose whole-grain products as much as possible. · Limit lean meat, poultry, and fish to 2 servings each day. A serving is 3 ounces, about the size of a deck of cards. · Eat 4 to 5 servings of nuts, seeds, and legumes (cooked dried beans, lentils, and split peas) each week. A serving is 1/3 cup of nuts, 2 tablespoons of seeds, or ½ cup of cooked beans or peas. · Limit fats and oils to 2 to 3 servings each day. A serving is 1 teaspoon of vegetable oil or 2 tablespoons of salad dressing. · Limit sweets and added sugars to 5 servings or less a week. A serving is 1 tablespoon jelly or jam, ½ cup sorbet, or 1 cup of lemonade. · Eat less than 2,300 milligrams (mg) of sodium a day. If you limit your sodium to 1,500 mg a day, you can lower your blood pressure even more. Tips for success · Start small. Do not try to make dramatic changes to your diet all at once.  You might feel that you are missing out on your favorite foods and then be more likely to not follow the plan. Make small changes, and stick with them. Once those changes become habit, add a few more changes. · Try some of the following: ¨ Make it a goal to eat a fruit or vegetable at every meal and at snacks. This will make it easy to get the recommended amount of fruits and vegetables each day. ¨ Try yogurt topped with fruit and nuts for a snack or healthy dessert. ¨ Add lettuce, tomato, cucumber, and onion to sandwiches. ¨ Combine a ready-made pizza crust with low-fat mozzarella cheese and lots of vegetable toppings. Try using tomatoes, squash, spinach, broccoli, carrots, cauliflower, and onions. ¨ Have a variety of cut-up vegetables with a low-fat dip as an appetizer instead of chips and dip. ¨ Sprinkle sunflower seeds or chopped almonds over salads. Or try adding chopped walnuts or almonds to cooked vegetables. ¨ Try some vegetarian meals using beans and peas. Add garbanzo or kidney beans to salads. Make burritos and tacos with mashed scott beans or black beans. Where can you learn more? Go to http://azael-maritza.info/. Enter R422 in the search box to learn more about \"DASH Diet: Care Instructions. \" Current as of: December 6, 2017 Content Version: 11.7 © 3033-2376 [employer]. Care instructions adapted under license by [employer] (which disclaims liability or warranty for this information). If you have questions about a medical condition or this instruction, always ask your healthcare professional. [place] [address] any warranty or liability for your use of this information. Introducing [facility]! Dear Aurora Bhagat: Thank you for requesting a [facility] account. Our records indicate that you already have an active [employer] account. You can access your account anytime at [email or web address]/"[employer]" Did you know that you can access your hospital and ER discharge instructions at any time in [facility]? You can also review all of your test results from your hospital stay or ER visit. Additional Information If you have questions, please visit the Frequently Asked Questions section of the [employer] website at [email or web address] Remember, [employer] is NOT to be used for urgent needs. For medical emergencies, dial 911. Now available from your iPhone and Android! Please provide this summary of care documentation to your next provider. Your primary care clinician is listed as Phys Other. If you have any questions after today's visit, please call [phone].

## 2018-08-07 NOTE — PROGRESS NOTES
1. Have you been to the ER, urgent care clinic, or been hospitalized since your last visit? No    2. Have you seen or consulted any other health care providers outside of the [address] [place] [address] since your last visit? Include any pap smears or colon screening.     Reviewed record in preparation for visit and have necessary documentation  Pt did not bring medication to office visit for review  Information was given to pt on Advanced Directives, Living Will  Goals that were addressed and/or need to be completed during or after this appointment include       Health Maintenance Due   Topic Date Due    DTaP/Tdap/Td series (1 - Tdap) 06/04/2011    Influenza Age 5 to Adult  08/01/2018

## 2018-08-07 NOTE — PROGRESS NOTES
Vidhya Chacon  29 y.o. female  1990  [address] [facility]  [id number]     [id number]: Progress Note       Encounter Date: 8/7/2018    Chief Complaint   Patient presents with    Medication Reaction     Cough. Patient stopped taking 1.5 wk ago.  Hypertension       History provided by patient  History of Present Illness   Stephanie Stuart is a 29 y.o. female who presents to clinic today for:    Hypertension: Uncontrolled   BP Readings from Last 3 Encounters:   08/07/18 (!) 148/93   06/01/18 106/66   05/11/18 149/88     The patient reports:  not taking medications regularly as instructed, side effects noted by patient include dry cough (patient stopped taking medication 1.5 weeks ago; since then cough has gradually improved, no TIA's, no chest pain on exertion, no dyspnea on exertion, no swelling of ankles. Sodium   Date Value Ref Range Status   05/11/2018 138 134 - 144 mmol/L Final     Potassium   Date Value Ref Range Status   05/11/2018 4.2 3.5 - 5.2 mmol/L Final     Chloride   Date Value Ref Range Status   05/11/2018 100 96 - 106 mmol/L Final     Creatinine   Date Value Ref Range Status   05/11/2018 0.83 0.57 - 1.00 mg/dL Final   11/03/2017 0.81 0.57 - 1.00 mg/dL Final   03/02/2015 0.74 0.57 - 1.00 mg/dL Final     GFR est AA   Date Value Ref Range Status   05/11/2018 112 >59 mL/min/1.73 Final   11/03/2017 115 >59 mL/min/1.73 Final   03/02/2015 131 >59 mL/min/1.73 Final     GFR est non-AA   Date Value Ref Range Status   05/11/2018 97 >59 mL/min/1.73 Final   11/03/2017 100 >59 mL/min/1.73 Final   03/02/2015 114 >59 mL/min/1.73 Final       Our goal is to normalize the blood pressure to decrease the risks of strokes and heart attacks. The patient is in agreement with the plan. Review of Systems   Review of Systems   Constitutional: Negative for chills and fever. Eyes: Negative for blurred vision, double vision and photophobia. Respiratory: Positive for cough. Negative for sputum production, shortness of breath and wheezing. Cardiovascular: Negative for chest pain and palpitations. Gastrointestinal: Negative for abdominal pain, diarrhea, nausea and vomiting. Skin: Negative for itching and rash. Neurological: Negative for dizziness, focal weakness and headaches. Vitals/Objective:     Vitals:    08/07/18 0848   BP: (!) 148/93   Pulse: 61   Resp: 16   Temp: 98.4 °F (36.9 °C)   TempSrc: Oral   SpO2: 99%   Weight: 221 lb (100.2 kg)   Height: 5' 4\" (1.626 m)     Body mass index is 37.93 kg/(m^2). Wt Readings from Last 3 Encounters:   08/07/18 221 lb (100.2 kg)   06/01/18 217 lb (98.4 kg)   05/11/18 217 lb (98.4 kg)       Physical Exam   Constitutional: She is oriented to person, place, and time. She appears well-developed and well-nourished. HENT:   Head: Normocephalic and atraumatic. Eyes: EOM are normal. Pupils are equal, round, and reactive to light. Neck: Neck supple. Cardiovascular: Normal rate and regular rhythm. No murmur heard. Pulmonary/Chest: Effort normal and breath sounds normal. No respiratory distress. Neurological: She is alert and oriented to person, place, and time. Skin: Skin is warm and dry. Psychiatric: She has a normal mood and affect. Her behavior is normal.        No results found for this or any previous visit (from the past 24 hour(s)). Assessment and Plan:     Encounter Diagnoses     ICD-10-CM ICD-9-CM   1. Essential hypertension I10 401.9       1. Essential hypertension  Patient encouraged to keep BP log.   - losartan-hydroCHLOROthiazide (HYZAAR) 50-12.5 mg per tablet; Take 1 Tab by mouth daily. Dispense: 30 Tab; Refill: 1    I have discussed the diagnosis with the patient and the intended plan as seen in the above orders. she has expressed understanding. The patient has received an after-visit summary and questions were answered concerning future plans.   I have discussed medication side effects and warnings with the patient as well. Electronically Signed: Rob Alberto MD     History/Allergies   Patients past medical, surgical and family histories were reviewed and updated. Past Medical History:   Diagnosis Date    Abnormal chest x-ray     Abnormal Pap smear     Encounter for IUD insertion 03/28/2017    mirena replaced    HPV vaccine counseling     Pt completed Gardasil series    HSV-2 infection     PPD positive       Past Surgical History:   Procedure Laterality Date    HX COLPOSCOPY  12/09    negative    HX HEENT  2015    wisdom tooth extraction     Family History   Problem Relation Age of Onset    Anemia Mother     Sickle Cell Anemia Sister     Sickle Cell Trait Sister     Hypertension Father     Sickle Cell Trait Father     Clotting Disorder Father     Hypertension Paternal Grandmother     Lupus Other      Social History     Social History    Marital status: SINGLE     Spouse name: N/A    Number of children: N/A    Years of education: N/A     Occupational History    Not on file. Social History Main Topics    Smoking status: Never Smoker    Smokeless tobacco: Never Used    Alcohol use No    Drug use: No    Sexual activity: Yes     Partners: Male     Birth control/ protection: IUD     Other Topics Concern    Not on file     Social History Narrative         Allergies   Allergen Reactions    Codeine Hives and Itching    Lisinopril-Hydrochlorothiazide Cough     Patient states she cannot take because she could not stop coughing.  Pcn [Penicillins] Hives and Itching       Disposition     Follow-up Disposition:  Return in about 4 weeks (around 9/4/2018) for Routine with blood work. No future appointments. Current Medications after this visit     Current Outpatient Prescriptions   Medication Sig    losartan-hydroCHLOROthiazide (HYZAAR) 50-12.5 mg per tablet Take 1 Tab by mouth daily.     traZODone (DESYREL) 50 mg tablet Take 1 Tab by mouth nightly as needed for Sleep.  levonorgestrel (MIRENA) 20 mcg/24 hr (5 years) IUD 1 Device by IntraUTERine route once.  diphenhydrAMINE (BENADRYL) 25 mg capsule Take 25 mg by mouth every six (6) hours as needed for Sleep. No current facility-administered medications for this visit.       Medications Discontinued During This Encounter   Medication Reason    lisinopril-hydroCHLOROthiazide (PRINZIDE, ZESTORETIC) 20-25 mg per tablet Side Effects    MULTIVIT WITH CALCIUM,IRON,MIN (WOMEN'S DAILY MULTIVITAMIN PO) Not A Current Medication

## 2018-09-17 ENCOUNTER — OFFICE VISIT (OUTPATIENT)
Dept: FAMILY MEDICINE CLINIC | Age: 28
End: 2018-09-17

## 2018-09-17 VITALS
HEIGHT: 64 IN | SYSTOLIC BLOOD PRESSURE: 135 MMHG | BODY MASS INDEX: 37.05 KG/M2 | DIASTOLIC BLOOD PRESSURE: 88 MMHG | HEART RATE: 53 BPM | RESPIRATION RATE: 16 BRPM | WEIGHT: 217 LBS | TEMPERATURE: 98.4 F | OXYGEN SATURATION: 97 %

## 2018-09-17 DIAGNOSIS — Z23 ENCOUNTER FOR IMMUNIZATION: ICD-10-CM

## 2018-09-17 DIAGNOSIS — B35.1 ONYCHOMYCOSIS: ICD-10-CM

## 2018-09-17 DIAGNOSIS — I10 ESSENTIAL HYPERTENSION: Primary | ICD-10-CM

## 2018-09-17 RX ORDER — LOSARTAN POTASSIUM AND HYDROCHLOROTHIAZIDE 12.5; 1 MG/1; MG/1
1 TABLET ORAL DAILY
Qty: 30 TAB | Refills: 2 | Status: SHIPPED | OUTPATIENT
Start: 2018-09-17 | End: 2018-12-18 | Stop reason: SDUPTHER

## 2018-09-17 NOTE — PROGRESS NOTES
Love Needles  29 y.o. female  1990  [address] [facility]  [id number]     [facility] Practice: Progress Note       Encounter Date: 9/17/2018    Chief Complaint   Patient presents with    Hypertension    Nail Problem     toenail on Left foot turning brown (noticed in July)    Immunization/Injection     influenza vaccine       History provided by patient  History of Present Illness   Stephanie Hancock is a 29 y.o. female who presents to clinic today for:    Hypertension: Uncontrolled   BP Readings from Last 3 Encounters:   09/17/18 135/88   08/07/18 (!) 148/93   06/01/18 106/66     The patient reports:  taking medications as instructed, no medication side effects noted, no TIA's, no chest pain on exertion, no dyspnea on exertion, no swelling of ankles. Sodium   Date Value Ref Range Status   05/11/2018 138 134 - 144 mmol/L Final     Potassium   Date Value Ref Range Status   05/11/2018 4.2 3.5 - 5.2 mmol/L Final     Chloride   Date Value Ref Range Status   05/11/2018 100 96 - 106 mmol/L Final     Creatinine   Date Value Ref Range Status   05/11/2018 0.83 0.57 - 1.00 mg/dL Final   11/03/2017 0.81 0.57 - 1.00 mg/dL Final   03/02/2015 0.74 0.57 - 1.00 mg/dL Final     GFR est AA   Date Value Ref Range Status   05/11/2018 112 >59 mL/min/1.73 Final   11/03/2017 115 >59 mL/min/1.73 Final   03/02/2015 131 >59 mL/min/1.73 Final     GFR est non-AA   Date Value Ref Range Status   05/11/2018 97 >59 mL/min/1.73 Final   11/03/2017 100 >59 mL/min/1.73 Final   03/02/2015 114 >59 mL/min/1.73 Final       Our goal is to normalize the blood pressure to decrease the risks of strokes and heart attacks. The patient is in agreement with the plan. Toenail on left second toe discolored  Patient present with cc of discoloration of the toenail on the left second digit.  Reports that she had been putting OTC topical anti-fungal on the nail but did not see any changes in the past month and was concerned that she was treating the wrong condition. Health Maintenance  Flu today. Health Maintenance Due   Topic Date Due    DTaP/Tdap/Td series (1 - Tdap) 06/04/2011    Influenza Age 5 to Adult  08/01/2018     Review of Systems   Review of Systems   Constitutional: Negative for chills and fever. Respiratory: Negative for cough, sputum production and shortness of breath. Cardiovascular: Negative for chest pain and palpitations. Gastrointestinal: Negative for abdominal pain, diarrhea, nausea and vomiting. Genitourinary: Negative for dysuria and urgency. Skin: Negative for itching and rash. Neurological: Negative for dizziness, focal weakness and headaches. Vitals/Objective:     Vitals:    09/17/18 0842   BP: 135/88   Pulse: (!) 53   Resp: 16   Temp: 98.4 °F (36.9 °C)   TempSrc: Oral   SpO2: 97%   Weight: 217 lb (98.4 kg)   Height: 5' 4\" (1.626 m)     Body mass index is 37.25 kg/(m^2). Wt Readings from Last 3 Encounters:   09/17/18 217 lb (98.4 kg)   08/07/18 221 lb (100.2 kg)   06/01/18 217 lb (98.4 kg)       Physical Exam   Constitutional: She is oriented to person, place, and time. She appears well-developed and well-nourished. HENT:   Head: Normocephalic. Cardiovascular: Normal rate and regular rhythm. Pulmonary/Chest: Effort normal and breath sounds normal. No respiratory distress. She has no wheezes. Musculoskeletal:        Feet:    Neurological: She is alert and oriented to person, place, and time. No results found for this or any previous visit (from the past 24 hour(s)). Assessment and Plan:     Encounter Diagnoses     ICD-10-CM ICD-9-CM   1. Essential hypertension I10 401.9   2. Encounter for immunization Z23 V03.89   3. Onychomycosis B35.1 110.1       1. Essential hypertension  Home BP fluctuating but still elevated above goal. Increased dose of medication.   - losartan-hydroCHLOROthiazide (HYZAAR) 100-12.5 mg per tablet;  Take 1 Tab by mouth daily.  Dispense: 30 Tab; Refill: 2    2. Encounter for immunization  - INFLUENZA VIRUS VACCINE QUADRIVALENT, PRESERVATIVE FREE SYRINGE (11014)  - ME IMMUNIZ ADMIN,1 SINGLE/COMB VAC/TOXOID    3. Onychomycosis  Continue topical therapy as only single nail is effected. I have discussed the diagnosis with the patient and the intended plan as seen in the above orders. she has expressed understanding. The patient has received an after-visit summary and questions were answered concerning future plans. I have discussed medication side effects and warnings with the patient as well. Electronically Signed: Pelon Garsia MD     History/Allergies   Patients past medical, surgical and family histories were reviewed and updated. Past Medical History:   Diagnosis Date    Abnormal chest x-ray     Abnormal Pap smear     Encounter for IUD insertion 03/28/2017    mirena replaced    HPV vaccine counseling     Pt completed Gardasil series    HSV-2 infection     PPD positive       Past Surgical History:   Procedure Laterality Date    HX COLPOSCOPY  12/09    negative    HX HEENT  2015    wisdom tooth extraction     Family History   Problem Relation Age of Onset    Anemia Mother     Sickle Cell Anemia Sister     Sickle Cell Trait Sister     Hypertension Father     Sickle Cell Trait Father     Clotting Disorder Father     Hypertension Paternal Grandmother     Lupus Other      Social History     Social History    Marital status: SINGLE     Spouse name: N/A    Number of children: N/A    Years of education: N/A     Occupational History    Not on file.      Social History Main Topics    Smoking status: Never Smoker    Smokeless tobacco: Never Used    Alcohol use No    Drug use: No    Sexual activity: Yes     Partners: Male     Birth control/ protection: IUD     Other Topics Concern    Not on file     Social History Narrative         Allergies   Allergen Reactions    Codeine Hives and Itching    Lisinopril-Hydrochlorothiazide Cough     Patient states she cannot take because she could not stop coughing.  Pcn [Penicillins] Hives and Itching       Disposition     Follow-up Disposition:  Return in about 3 months (around 12/17/2018) for Routine with blood work, Please bring your blood pressure logs and machine! .    No future appointments. Current Medications after this visit     Current Outpatient Prescriptions   Medication Sig    losartan-hydroCHLOROthiazide (HYZAAR) 100-12.5 mg per tablet Take 1 Tab by mouth daily.  traZODone (DESYREL) 50 mg tablet Take 1 Tab by mouth nightly as needed for Sleep.  levonorgestrel (MIRENA) 20 mcg/24 hr (5 years) IUD 1 Device by IntraUTERine route once.  diphenhydrAMINE (BENADRYL) 25 mg capsule Take 25 mg by mouth every six (6) hours as needed for Sleep. No current facility-administered medications for this visit.       Medications Discontinued During This Encounter   Medication Reason    losartan-hydroCHLOROthiazide (HYZAAR) 50-12.5 mg per tablet Dose Adjustment

## 2018-09-17 NOTE — PROGRESS NOTES
1. Have you been to the ER, urgent care clinic, or been hospitalized since your last visit? No     2. Have you seen or consulted any other health care providers outside of the [address] [place] [address] since your last visit?   No     Reviewed record in preparation for visit and have necessary documentation  opportunity was given for questions  Goals that were addressed and/or need to be completed during or after this appointment include     Health Maintenance Due   Topic Date Due    DTaP/Tdap/Td series (1 - Tdap) 06/04/2011    Influenza Age 5 to Adult  08/01/2018

## 2018-09-17 NOTE — PATIENT INSTRUCTIONS
Toenail Fungus: Care Instructions  Your Care Instructions  A toenail that is infected by a fungus usually turns white or yellow. As the fungus spreads, the nail turns a darker color and gets thicker, and its edges start to turn ragged and crumble. A bad infection can cause toe pain, and the nail may pull away from the toe. Toenails that are exposed to moisture and warmth a lot are more likely to get infected by a fungus. This can happen from wearing sweaty shoes often and from walking barefoot on shower floors. It is hard to treat toenail fungus, and the infection can return after it has cleared up. But medicines can sometimes get rid of toenail fungus for good. If the infection is very bad, or if it causes a lot of pain, you may need to have the nail removed. Follow-up care is a key part of your treatment and safety. Be sure to make and go to all appointments, and call your doctor if you are having problems. It's also a good idea to know your test results and keep a list of the medicines you take. How can you care for yourself at home? · Take your medicines exactly as prescribed. Call your doctor if you have any problems with your medicine. You will get more details on the specific medicines your doctor prescribes. · If your doctor gave you a cream or liquid to put on your toenail, use it exactly as directed. · Wash your feet often, and wash your hands after touching your feet. · Keep your toenails clean and dry. Dry your feet completely after you bathe and before you put on shoes and socks. · Keep your toenails trimmed. · Change socks often. Wear dry socks that absorb moisture. · Do not go barefoot in public places. · Use a spray or powder that fights fungus on your feet and in your shoes. · Do not pick at the skin around your nails. · Do not use nail polish or fake nails on your toenails. When should you call for help?   Call your doctor now or seek immediate medical care if:    · You have signs of infection, such as:  ¨ Increased pain, swelling, warmth, or redness. ¨ Red streaks leading from the site. ¨ Pus draining from the site. ¨ A fever.     · You have new or increased toe pain.    Watch closely for changes in your health, and be sure to contact your doctor if:    · You do not get better as expected. Where can you learn more? Go to http://azael-maritza.info/. Enter D202 in the search box to learn more about \"Toenail Fungus: Care Instructions. \"  Current as of: October 5, 2017  Content Version: 11.7  © 1633-8190 [employer]. Care instructions adapted under license by [employer] (which disclaims liability or warranty for this information). If you have questions about a medical condition or this instruction, always ask your healthcare professional. [address] any warranty or liability for your use of this information.

## 2018-09-17 NOTE — LETTER
9/17/2018 8:51 AM 
 
Ms. Stephanie Dalton [address] [place] [address] To Whom It May Concern: 
 
Eugene Bueno received an influenza vaccine on 09/17/18 Lot # [id number] Expiration Date 06/12/2019 [facility] Sincerely, 
 
 
Migdalia Rodriguez MD

## 2018-09-17 NOTE — MR AVS SNAPSHOT
[address] [address] 
[phone] Patient: Jacinda Trevizo MRN: [id number] THL:[id number] Visit Information Date & Time Provider Department Dept. Phone Encounter #  
 9/17/2018  8:50 AM Yohana Garcia MD  [facility] [id number] Follow-up Instructions Return in about 3 months (around 12/17/2018) for Routine with blood work, Please bring your blood pressure logs and machine! .  
  
Upcoming Health Maintenance Date Due DTaP/Tdap/Td series (1 - Tdap) 6/4/2011 Influenza Age 5 to Adult 8/1/2018 PAP AKA CERVICAL CYTOLOGY 5/5/2020 Allergies as of 9/17/2018  Review Complete On: 9/17/2018 By: Yohana Garcia MD  
  
 Severity Noted Reaction Type Reactions Codeine  05/10/2010    Hives, Itching Lisinopril-hydrochlorothiazide  08/07/2018    Cough Patient states she cannot take because she could not stop coughing. Pcn [Penicillins]  05/10/2010    Hives, Itching Current Immunizations  Never Reviewed Name Date HPV (Quad) 7/2/2015 11:14 AM, 2/24/2015 Influenza Vaccine (Quad) PF 9/17/2018, 11/3/2017 Not reviewed this visit You Were Diagnosed With   
  
 Codes Comments Essential hypertension    -  Primary ICD-10-CM: I10 
ICD-9-CM: 401.9 Encounter for immunization     ICD-10-CM: S15 ICD-9-CM: V03.89 Onychomycosis     ICD-10-CM: B35.1 ICD-9-CM: 110.1 Vitals BP Pulse Temp Resp Height(growth percentile) Weight(growth percentile) 135/88 (!) 53 98.4 °F (36.9 °C) (Oral) 16 5' 4\" (1.626 m) 217 lb (98.4 kg) SpO2 BMI OB Status Smoking Status 97% 37.25 kg/m2 IUD Never Smoker Vitals History BMI and BSA Data Body Mass Index Body Surface Area  
 37.25 kg/m 2 2.11 m 2 Preferred Pharmacy Pharmacy Name Phone [address] [place], [place] - [address] [phone] Your Updated Medication List  
  
   
This list is accurate as of 9/17/18  9:01 AM.  Always use your most recent med list.  
  
  
  
  
 BENADRYL 25 mg capsule Generic drug:  diphenhydrAMINE Take 25 mg by mouth every six (6) hours as needed for Sleep.  
  
 losartan-hydroCHLOROthiazide 100-12.5 mg per tablet Commonly known as:  HYZAAR Take 1 Tab by mouth daily. MIRENA 20 mcg/24 hr (5 years) Iud  
Generic drug:  levonorgestrel 1 Device by IntraUTERine route once. traZODone 50 mg tablet Commonly known as:  Seamus Oh Take 1 Tab by mouth nightly as needed for Sleep. Prescriptions Sent to Pharmacy Refills  
 losartan-hydroCHLOROthiazide (HYZAAR) 100-12.5 mg per tablet 2 Sig: Take 1 Tab by mouth daily. Class: Normal  
 Pharmacy: [address] [place] [address] #: [phone] Route: Oral  
  
We Performed the Following INFLUENZA VIRUS VAC QUAD,SPLIT,PRESV FREE SYRINGE IM [id number] CPT(R)] MN IMMUNIZ ADMIN,1 SINGLE/COMB VAC/TOXOID [id number] CPT(R)] Follow-up Instructions Return in about 3 months (around 12/17/2018) for Routine with blood work, Please bring your blood pressure logs and machine! .  
  
  
Patient Instructions Toenail Fungus: Care Instructions Your Care Instructions A toenail that is infected by a fungus usually turns white or yellow. As the fungus spreads, the nail turns a darker color and gets thicker, and its edges start to turn ragged and crumble. A bad infection can cause toe pain, and the nail may pull away from the toe. Toenails that are exposed to moisture and warmth a lot are more likely to get infected by a fungus. This can happen from wearing sweaty shoes often and from walking barefoot on shower floors. It is hard to treat toenail fungus, and the infection can return after it has cleared up.  But medicines can sometimes get rid of toenail fungus for good. If the infection is very bad, or if it causes a lot of pain, you may need to have the nail removed. Follow-up care is a key part of your treatment and safety. Be sure to make and go to all appointments, and call your doctor if you are having problems. It's also a good idea to know your test results and keep a list of the medicines you take. How can you care for yourself at home? · Take your medicines exactly as prescribed. Call your doctor if you have any problems with your medicine. You will get more details on the specific medicines your doctor prescribes. · If your doctor gave you a cream or liquid to put on your toenail, use it exactly as directed. · Wash your feet often, and wash your hands after touching your feet. · Keep your toenails clean and dry. Dry your feet completely after you bathe and before you put on shoes and socks. · Keep your toenails trimmed. · Change socks often. Wear dry socks that absorb moisture. · Do not go barefoot in public places. · Use a spray or powder that fights fungus on your feet and in your shoes. · Do not pick at the skin around your nails. · Do not use nail polish or fake nails on your toenails. When should you call for help? Call your doctor now or seek immediate medical care if: 
  · You have signs of infection, such as: 
¨ Increased pain, swelling, warmth, or redness. ¨ Red streaks leading from the site. ¨ Pus draining from the site. ¨ A fever.  
  · You have new or increased toe pain.  
 Watch closely for changes in your health, and be sure to contact your doctor if: 
  · You do not get better as expected. Where can you learn more? Go to http://azael-maritza.info/. Enter D202 in the search box to learn more about \"Toenail Fungus: Care Instructions. \" Current as of: October 5, 2017 Content Version: 11.7 © 7121-5593 [employer].  Care instructions adapted under license by [address] (which disclaims liability or warranty for this information). If you have questions about a medical condition or this instruction, always ask your healthcare professional. [address] any warranty or liability for your use of this information. Introducing [facility]! Dear Danae Alberto: Thank you for requesting a [employer] account. Our records indicate that you already have an active [employer] account. You can access your account anytime at [email or web address] Did you know that you can access your hospital and ER discharge instructions at any time in [employer]? You can also review all of your test results from your hospital stay or ER visit. Additional Information If you have questions, please visit the Frequently Asked Questions section of the [employer] website at [email or web address] Remember, [employer] is NOT to be used for urgent needs. For medical emergencies, dial 911. Now available from your iPhone and Android! Please provide this summary of care documentation to your next provider. Your primary care clinician is listed as Phys Other. If you have any questions after today's visit, please call [phone].

## 2018-10-29 DIAGNOSIS — G47.00 INSOMNIA, UNSPECIFIED TYPE: ICD-10-CM

## 2018-10-29 RX ORDER — TRAZODONE HYDROCHLORIDE 50 MG/1
TABLET ORAL
Qty: 30 TAB | Refills: 0 | Status: SHIPPED | OUTPATIENT
Start: 2018-10-29 | End: 2019-04-11 | Stop reason: SDUPTHER

## 2018-12-18 DIAGNOSIS — I10 ESSENTIAL HYPERTENSION: ICD-10-CM

## 2018-12-20 RX ORDER — LOSARTAN POTASSIUM AND HYDROCHLOROTHIAZIDE 12.5; 1 MG/1; MG/1
1 TABLET ORAL DAILY
Qty: 30 TAB | Refills: 0 | Status: SHIPPED | OUTPATIENT
Start: 2018-12-20 | End: 2019-01-23 | Stop reason: DRUGHIGH

## 2019-01-23 ENCOUNTER — OFFICE VISIT (OUTPATIENT)
Dept: FAMILY MEDICINE CLINIC | Age: 29
End: 2019-01-23

## 2019-01-23 VITALS
BODY MASS INDEX: 38.58 KG/M2 | RESPIRATION RATE: 20 BRPM | DIASTOLIC BLOOD PRESSURE: 84 MMHG | TEMPERATURE: 97.8 F | SYSTOLIC BLOOD PRESSURE: 140 MMHG | WEIGHT: 226 LBS | OXYGEN SATURATION: 100 % | HEART RATE: 52 BPM | HEIGHT: 64 IN

## 2019-01-23 DIAGNOSIS — Z00.00 WELL WOMAN EXAM (NO GYNECOLOGICAL EXAM): Primary | ICD-10-CM

## 2019-01-23 DIAGNOSIS — I10 ESSENTIAL HYPERTENSION: ICD-10-CM

## 2019-01-23 RX ORDER — LOSARTAN POTASSIUM AND HYDROCHLOROTHIAZIDE 25; 100 MG/1; MG/1
1 TABLET ORAL DAILY
Qty: 30 TAB | Refills: 2 | Status: SHIPPED | OUTPATIENT
Start: 2019-01-23 | End: 2019-04-11 | Stop reason: SDUPTHER

## 2019-01-23 NOTE — PROGRESS NOTES
Stephanie Bella 
29 y.o. female 1990 [address] [place] [address] 
[id number] [facility] Practice: Progress Note Encounter Date: 1/23/2019 Chief Complaint Patient presents with  Physical  
 Hypertension  
  patient states pressure has been running high, more frequent headaches with slight blurriness in eye History provided by patient History of Present Illness Renella Schaumann is a 29 y.o. female who presents to clinic today for: 
 
Physical 
Patient present with cc of annual physical. Patient reports that she is doing well. Pap smear is UTD. Hypertension: Uncontrolled BP Readings from Last 3 Encounters:  
01/23/19 140/84  
09/17/18 135/88  
08/07/18 (!) 148/93 The patient reports:  taking medications as instructed, no medication side effects noted, no TIA's, no chest pain on exertion, no dyspnea on exertion, no swelling of ankles. Patient reports increase in headaches Home monitoring:Yes: Comment: 160-170/100-110 Sodium Date Value Ref Range Status 05/11/2018 138 134 - 144 mmol/L Final  
 
Potassium Date Value Ref Range Status 05/11/2018 4.2 3.5 - 5.2 mmol/L Final  
 
Creatinine Date Value Ref Range Status 05/11/2018 0.83 0.57 - 1.00 mg/dL Final  
11/03/2017 0.81 0.57 - 1.00 mg/dL Final  
 
 
Our goal is to normalize the blood pressure to decrease the risks of strokes and heart attacks. The patient is in agreement with the plan. Health Maintenance Health Maintenance Due Topic Date Due  
 DTaP/Tdap/Td series (1 - Tdap) 06/04/2011 Review of Systems Review of Systems Constitutional: Negative for chills and fever. HENT: Negative for congestion, ear discharge and ear pain. Eyes: Positive for pain. Negative for blurred vision, double vision, discharge and redness. Respiratory: Negative for cough, sputum production and shortness of breath. Gastrointestinal: Negative for abdominal pain, constipation, diarrhea, nausea and vomiting. Genitourinary: Negative for dysuria, frequency, hematuria and urgency. Skin: Negative for itching and rash. Neurological: Positive for headaches. Negative for dizziness, tingling and tremors. Vitals/Objective:  
 
Vitals:  
 01/23/19 2127 01/23/19 2080 BP: (!) 153/97 140/84 Pulse: (!) 54 (!) 52 Resp: 20 Temp: 97.8 °F (36.6 °C) TempSrc: Oral   
SpO2: 100% Weight: 226 lb (102.5 kg) Height: 5' 4\" (1.626 m) Body mass index is 38.79 kg/m². Wt Readings from Last 3 Encounters:  
01/23/19 226 lb (102.5 kg) 09/17/18 217 lb (98.4 kg) 08/07/18 221 lb (100.2 kg) Physical Exam  
Constitutional: She is oriented to person, place, and time. She appears well-developed and well-nourished. HENT:  
Head: Normocephalic and atraumatic. Cardiovascular: Normal rate and regular rhythm. No murmur heard. Pulmonary/Chest: Effort normal and breath sounds normal.  
Neurological: She is alert and oriented to person, place, and time. Skin: Skin is warm and dry. No results found for this or any previous visit (from the past 24 hour(s)). Assessment and Plan:  
 
Encounter Diagnoses ICD-10-CM ICD-9-CM 1. Well woman exam (no gynecological exam) Z00.00 V70.0 2. Essential hypertension I10 401.9 1. Well woman exam (no gynecological exam) - BMP 
- Lipid 2. Essential hypertension Increase HCTZ. Patient to keep log and bring machine to next visit. - losartan-hydroCHLOROthiazide (HYZAAR) 100-25 mg per tablet; Take 1 Tab by mouth daily. Dispense: 30 Tab; Refill: 2 I have discussed the diagnosis with the patient and the intended plan as seen in the above orders. she has expressed understanding. The patient has received an after-visit summary and questions were answered concerning future plans. I have discussed medication side effects and warnings with the patient as well. Electronically Signed: Yesica Grace MD 
  
History/Allergies Patients past medical, surgical and family histories were reviewed and updated. Past Medical History:  
Diagnosis Date  Abnormal chest x-ray  Abnormal Pap smear  Encounter for IUD insertion 03/28/2017  
 mirena replaced  HPV vaccine counseling Pt completed Gardasil series  HSV-2 infection  PPD positive Past Surgical History:  
Procedure Laterality Date  HX COLPOSCOPY  12/09  
 negative  HX HEENT  2015  
 wisdom tooth extraction Family History Problem Relation Age of Onset  Anemia Mother  Sickle Cell Anemia Sister  Sickle Cell Trait Sister  Hypertension Father  Sickle Cell Trait Father  Clotting Disorder Father  Hypertension Paternal Grandmother  Lupus Other Social History Socioeconomic History  Marital status: SINGLE Spouse name: Not on file  Number of children: Not on file  Years of education: Not on file  Highest education level: Not on file Social Needs  Financial resource strain: Not on file  Food insecurity - worry: Not on file  Food insecurity - inability: Not on file  Transportation needs - medical: Not on file  Transportation needs - non-medical: Not on file Occupational History  Not on file Tobacco Use  Smoking status: Never Smoker  Smokeless tobacco: Never Used Substance and Sexual Activity  Alcohol use: No  
 Drug use: No  
 Sexual activity: Yes  
  Partners: Male Birth control/protection: IUD Other Topics Concern  Not on file Social History Narrative  Not on file Allergies Allergen Reactions  Codeine Hives and Itching  Lisinopril-Hydrochlorothiazide Cough Patient states she cannot take because she could not stop coughing.  Pcn [Penicillins] Hives and Itching Disposition Follow-up Disposition: Return in about 3 weeks (around 2/13/2019) for Blood Pressure Check., Please bring your blood pressure logs and machine! . 
 
No future appointments. Current Medications after this visit Current Outpatient Medications Medication Sig  
 losartan-hydroCHLOROthiazide (HYZAAR) 100-25 mg per tablet Take 1 Tab by mouth daily.  traZODone (DESYREL) 50 mg tablet TAKE 1 TABLET BY MOUTH NIGHTLY AS NEEDED  
 levonorgestrel (MIRENA) 20 mcg/24 hr (5 years) IUD 1 Device by IntraUTERine route once.  diphenhydrAMINE (BENADRYL) 25 mg capsule Take 25 mg by mouth every six (6) hours as needed for Sleep. No current facility-administered medications for this visit. Medications Discontinued During This Encounter Medication Reason  losartan-hydroCHLOROthiazide (HYZAAR) 100-12.5 mg per tablet Dose Adjustment

## 2019-01-23 NOTE — PROGRESS NOTES
1. Have you been to the ER, urgent care clinic since your last visit? Hospitalized since your last visit? No 
 
2. Have you seen or consulted any other health care providers outside of the [address] [place] [address] since your last visit? Include any pap smears or colon screening. No 
Reviewed record in preparation for visit and have necessary documentation Pt did not bring medication to office visit for review Goals that were addressed and/or need to be completed during or after this appointment include Health Maintenance Due Topic Date Due  
 DTaP/Tdap/Td series (1 - Tdap) 06/04/2011

## 2019-01-24 LAB
BUN SERPL-MCNC: 14 MG/DL (ref 6–20)
BUN/CREAT SERPL: 15 (ref 9–23)
CALCIUM SERPL-MCNC: 9.3 MG/DL (ref 8.7–10.2)
CHLORIDE SERPL-SCNC: 103 MMOL/L (ref 96–106)
CHOLEST SERPL-MCNC: 185 MG/DL (ref 100–199)
CO2 SERPL-SCNC: 22 MMOL/L (ref 20–29)
CREAT SERPL-MCNC: 0.91 MG/DL (ref 0.57–1)
GLUCOSE SERPL-MCNC: 100 MG/DL (ref 65–99)
HCT VFR BLD AUTO: 38 % (ref 34–46.6)
HDLC SERPL-MCNC: 54 MG/DL
HGB BLD-MCNC: 12.5 G/DL (ref 11.1–15.9)
LDLC SERPL CALC-MCNC: 123 MG/DL (ref 0–99)
POTASSIUM SERPL-SCNC: 4.3 MMOL/L (ref 3.5–5.2)
SODIUM SERPL-SCNC: 139 MMOL/L (ref 134–144)
TRIGL SERPL-MCNC: 42 MG/DL (ref 0–149)
VLDLC SERPL CALC-MCNC: 8 MG/DL (ref 5–40)

## 2019-04-11 DIAGNOSIS — G47.00 INSOMNIA, UNSPECIFIED TYPE: ICD-10-CM

## 2019-04-11 DIAGNOSIS — I10 ESSENTIAL HYPERTENSION: ICD-10-CM

## 2019-04-11 RX ORDER — TRAZODONE HYDROCHLORIDE 50 MG/1
TABLET ORAL
Qty: 30 TAB | Refills: 0 | Status: SHIPPED | OUTPATIENT
Start: 2019-04-11 | End: 2019-05-03

## 2019-04-11 RX ORDER — TRAZODONE HYDROCHLORIDE 50 MG/1
TABLET ORAL
Qty: 30 TAB | Refills: 0 | OUTPATIENT
Start: 2019-04-11

## 2019-04-11 RX ORDER — LOSARTAN POTASSIUM AND HYDROCHLOROTHIAZIDE 25; 100 MG/1; MG/1
1 TABLET ORAL DAILY
Qty: 30 TAB | Refills: 0 | Status: SHIPPED | OUTPATIENT
Start: 2019-04-11 | End: 2019-05-03

## 2019-04-11 RX ORDER — LOSARTAN POTASSIUM AND HYDROCHLOROTHIAZIDE 25; 100 MG/1; MG/1
TABLET ORAL
Qty: 30 TAB | Refills: 0 | OUTPATIENT
Start: 2019-04-11

## 2019-05-03 ENCOUNTER — OFFICE VISIT (OUTPATIENT)
Dept: FAMILY MEDICINE CLINIC | Age: 29
End: 2019-05-03

## 2019-05-03 VITALS
BODY MASS INDEX: 38.76 KG/M2 | TEMPERATURE: 97.8 F | OXYGEN SATURATION: 97 % | RESPIRATION RATE: 16 BRPM | HEART RATE: 75 BPM | HEIGHT: 64 IN | WEIGHT: 227 LBS | SYSTOLIC BLOOD PRESSURE: 131 MMHG | DIASTOLIC BLOOD PRESSURE: 93 MMHG

## 2019-05-03 DIAGNOSIS — R30.0 DYSURIA: ICD-10-CM

## 2019-05-03 DIAGNOSIS — N30.01 ACUTE CYSTITIS WITH HEMATURIA: ICD-10-CM

## 2019-05-03 DIAGNOSIS — I10 ESSENTIAL HYPERTENSION: Primary | ICD-10-CM

## 2019-05-03 DIAGNOSIS — G47.00 INSOMNIA, UNSPECIFIED TYPE: ICD-10-CM

## 2019-05-03 DIAGNOSIS — Z23 ENCOUNTER FOR IMMUNIZATION: ICD-10-CM

## 2019-05-03 LAB
BILIRUB UR QL STRIP: NEGATIVE
GLUCOSE UR-MCNC: NEGATIVE MG/DL
KETONES P FAST UR STRIP-MCNC: NEGATIVE MG/DL
PH UR STRIP: 6 [PH] (ref 4.6–8)
PROT UR QL STRIP: NORMAL
SP GR UR STRIP: 1.03 (ref 1–1.03)
UA UROBILINOGEN AMB POC: NORMAL (ref 0.2–1)
URINALYSIS CLARITY POC: CLEAR
URINALYSIS COLOR POC: YELLOW
URINE BLOOD POC: NORMAL
URINE LEUKOCYTES POC: NORMAL
URINE NITRITES POC: NEGATIVE

## 2019-05-03 RX ORDER — TRAZODONE HYDROCHLORIDE 50 MG/1
100 TABLET ORAL
Qty: 30 TAB | Refills: 0
Start: 2019-05-03 | End: 2019-05-24 | Stop reason: SDUPTHER

## 2019-05-03 RX ORDER — NIFEDIPINE 30 MG/1
TABLET, FILM COATED, EXTENDED RELEASE ORAL
Qty: 56 TAB | Refills: 0 | Status: SHIPPED | OUTPATIENT
Start: 2019-05-03 | End: 2019-06-07

## 2019-05-03 RX ORDER — NITROFURANTOIN 25; 75 MG/1; MG/1
100 CAPSULE ORAL 2 TIMES DAILY
Qty: 6 CAP | Refills: 0 | Status: SHIPPED | OUTPATIENT
Start: 2019-05-03 | End: 2019-05-24 | Stop reason: ALTCHOICE

## 2019-05-03 RX ORDER — LOSARTAN POTASSIUM AND HYDROCHLOROTHIAZIDE 25; 100 MG/1; MG/1
1 TABLET ORAL DAILY
Qty: 30 TAB | Refills: 0
Start: 2019-05-03 | End: 2019-05-17

## 2019-05-03 NOTE — PROGRESS NOTES
1. Have you been to the ER, urgent care clinic, or been hospitalized since your last visit? No     2. Have you seen or consulted any other health care providers outside of the [address] [place] [address] since your last visit?  No       Reviewed record in preparation for visit and have necessary documentation  opportunity was given for questions  Goals that were addressed and/or need to be completed during or after this appointment include   Health Maintenance Due   Topic Date Due    DTaP/Tdap/Td series (1 - Tdap) 06/04/2011     BP using home cuff  /99   143/94  Pulse 79    BP using clinic cuff

## 2019-05-03 NOTE — PROGRESS NOTES
Soumya Perez  29 y.o. female  1990  [address]  [phone]     [facility]: Progress Note       Encounter Date: 5/3/2019    Chief Complaint   Patient presents with    Hypertension     follow up 150-160s/100s    Urinary Burning     tingling sensation after urinating       History provided by patient  History of Present Illness   Soumya Perez is a 29 y.o. female who presents to clinic today for:    Hypertension: Uncontrolled   BP Readings from Last 3 Encounters:   05/03/19 (!) 131/93   01/23/19 140/84   09/17/18 135/88     The patient reports:  taking medications as instructed, no medication side effects noted, no TIA's, no chest pain on exertion, no dyspnea on exertion, no swelling of ankles. Home monitoring:Yes: Comment: home wrist monitor SBP 150s  Sodium   Date Value Ref Range Status   01/23/2019 139 134 - 144 mmol/L Final     Potassium   Date Value Ref Range Status   01/23/2019 4.3 3.5 - 5.2 mmol/L Final     Creatinine   Date Value Ref Range Status   01/23/2019 0.91 0.57 - 1.00 mg/dL Final   05/11/2018 0.83 0.57 - 1.00 mg/dL Final       Our goal is to normalize the blood pressure to decrease the risks of strokes and heart attacks. The patient is in agreement with the plan. Tingling with urination  Patient present with cc of tingling with urination for several weeks. Similar to prior UTI in 2017. Denies fever, chills, flank pain or hematuria. No recent abx. She is not taking any OTC for symptoms. Insomnia  Patient has been taking trazodone 50 mg qhs with benadryl 50 mg without good response. She only takes when she needs to work the next morning. She has difficulty falling asleep and staying asleep. Health Maintenance  Health Maintenance Due   Topic Date Due    DTaP/Tdap/Td series (1 - Tdap) 06/04/2011     Review of Systems   Review of Systems   Constitutional: Negative for chills and fever. Gastrointestinal: Negative for abdominal pain, constipation, diarrhea, nausea and vomiting. Genitourinary: Positive for dysuria and urgency. Negative for flank pain, frequency and hematuria. Skin: Negative for itching and rash. Neurological: Negative for dizziness and tingling. Vitals/Objective:     Vitals:    05/03/19 0846   BP: (!) 131/93   Pulse: 75   Resp: 16   Temp: 97.8 °F (36.6 °C)   TempSrc: Oral   SpO2: 97%   Weight: 227 lb (103 kg)   Height: 5' 4\" (1.626 m)     Body mass index is 38.96 kg/m². Wt Readings from Last 3 Encounters:   05/03/19 227 lb (103 kg)   01/23/19 226 lb (102.5 kg)   09/17/18 217 lb (98.4 kg)       Physical Exam   Constitutional: She appears well-developed and well-nourished. Cardiovascular: Normal rate and regular rhythm. Pulmonary/Chest: Effort normal and breath sounds normal.   Abdominal: Soft. Bowel sounds are normal. She exhibits no distension and no mass. There is no tenderness. There is no rigidity, no rebound, no guarding and no CVA tenderness. Recent Results (from the past 24 hour(s))   AMB POC URINALYSIS DIP STICK AUTO W/O MICRO    Collection Time: 05/03/19  8:58 AM   Result Value Ref Range    Color (UA POC) Yellow     Clarity (UA POC) Clear     Glucose (UA POC) Negative Negative    Bilirubin (UA POC) Negative Negative    Ketones (UA POC) Negative Negative    Specific gravity (UA POC) 1.030 1.001 - 1.035    Blood (UA POC) 1+ Negative    pH (UA POC) 6.0 4.6 - 8.0    Protein (UA POC) 1+ Negative    Urobilinogen (UA POC) 0.2 mg/dL 0.2 - 1    Nitrites (UA POC) Negative Negative    Leukocyte esterase (UA POC) Trace Negative     Assessment and Plan:     Encounter Diagnoses     ICD-10-CM ICD-9-CM   1. Essential hypertension I10 401.9   2. Acute cystitis with hematuria N30.01 595.0   3. Dysuria R30.0 788.1   4. Insomnia, unspecified type G47.00 780.52   5. Encounter for immunization Z23 V03.89       1.  Essential hypertension  Patient to be switched from losartan HCTZ to nifedipine as she wishes to try for a pregnancy in the near future. Titrate up on nifedipine as prior medication is stopped. Close follow with patient to start home monitoring.   - NIFEdipine ER (ADALAT CC) 30 mg ER tablet; Take 1 Tab by mouth daily for 14 days, THEN 2 Tabs daily for 21 days. Dispense: 56 Tab; Refill: 0  - losartan-hydroCHLOROthiazide (HYZAAR) 100-25 mg per tablet; Take 1 Tab by mouth daily for 14 days. Indications: high blood pressure  Dispense: 30 Tab; Refill: 0  [facility]    2. Acute cystitis with hematuria  3. Dysuria  Treatment with macrobid. Urine culture to be sent. Review of prior culture shows pan-sensitive e col.   - nitrofurantoin, macrocrystal-monohydrate, (MACROBID) 100 mg capsule; Take 1 Cap by mouth two (2) times a day. Dispense: 6 Cap; Refill: 0  - CULTURE, URINE  - AMB POC URINALYSIS DIP STICK AUTO W/O MICRO    4. Insomnia, unspecified type  Patient to increase dose of trazodone to 100 mg qhs. She will call in 1-2 weeks to inform if she is responding to new dose. - traZODone (DESYREL) 50 mg tablet; Take 2 Tabs by mouth nightly. Indications: insomnia  Dispense: 30 Tab; Refill: 0    5. Encounter for immunization  - diphtheria-pertussis, acellular,-tetanus (BOOSTRIX TDAP) 2.5-8-5 Lf-mcg-Lf/0.5mL susp susp; 0.5 mL by IntraMUSCular route once for 1 dose. Please fax confirmation to the attn of prescribing provider at fax number listed above. Indications: Treatment to Prevent Diphtheria, Pertussis and Tetanus  Dispense: 0.5 mL; Refill: 0      I have discussed the diagnosis with the patient and the intended plan as seen in the above orders. she has expressed understanding. The patient has received an after-visit summary and questions were answered concerning future plans. I have discussed medication side effects and warnings with the patient as well.     Electronically Signed: Yohana Garcia MD     History/Allergies   Patients past medical, surgical and family histories were reviewed and updated. Past Medical History:   Diagnosis Date    Abnormal chest x-ray     Abnormal Pap smear     Encounter for IUD insertion 03/28/2017    mirena replaced    HPV vaccine counseling     Pt completed Gardasil series    HSV-2 infection     PPD positive       Past Surgical History:   Procedure Laterality Date    HX COLPOSCOPY  12/09    negative    HX HEENT  2015    wisdom tooth extraction     Family History   Problem Relation Age of Onset    Anemia Mother     Sickle Cell Anemia Sister     Sickle Cell Trait Sister     Hypertension Father     Sickle Cell Trait Father     Clotting Disorder Father     Hypertension Paternal Grandmother     Lupus Other      Social History     Tobacco Use    Smoking status: Never Smoker    Smokeless tobacco: Never Used   Substance Use Topics    Alcohol use: No    Drug use: No          Allergies   Allergen Reactions    Codeine Hives and Itching    Lisinopril-Hydrochlorothiazide Cough     Patient states she cannot take because she could not stop coughing.  Pcn [Penicillins] Hives and Itching       Disposition     Follow-up and Dispositions  ·   Return in about 1 month (around 5/31/2019). No future appointments. Current Medications after this visit     Current Outpatient Medications   Medication Sig    nitrofurantoin, macrocrystal-monohydrate, (MACROBID) 100 mg capsule Take 1 Cap by mouth two (2) times a day.  traZODone (DESYREL) 50 mg tablet Take 2 Tabs by mouth nightly. Indications: insomnia    NIFEdipine ER (ADALAT CC) 30 mg ER tablet Take 1 Tab by mouth daily for 14 days, THEN 2 Tabs daily for 21 days.  losartan-hydroCHLOROthiazide (HYZAAR) 100-25 mg per tablet Take 1 Tab by mouth daily for 14 days. Indications: high blood pressure    diphtheria-pertussis, acellular,-tetanus (BOOSTRIX TDAP) 2.5-8-5 Lf-mcg-Lf/0.5mL susp susp 0.5 mL by IntraMUSCular route once for 1 dose.  Please fax confirmation to the attn of prescribing provider at fax number listed above. Indications: Treatment to Prevent Diphtheria, Pertussis and Tetanus    levonorgestrel (MIRENA) 20 mcg/24 hr (5 years) IUD 1 Device by IntraUTERine route once.  diphenhydrAMINE (BENADRYL) 25 mg capsule Take 25 mg by mouth every six (6) hours as needed for Sleep. No current facility-administered medications for this visit.       Medications Discontinued During This Encounter   Medication Reason    traZODone (DESYREL) 50 mg tablet     losartan-hydroCHLOROthiazide (HYZAAR) 100-25 mg per tablet

## 2019-05-03 NOTE — PATIENT INSTRUCTIONS
High Blood Pressure: Care Instructions  Overview    It's normal for blood pressure to go up and down throughout the day. But if it stays up, you have high blood pressure. Another name for high blood pressure is hypertension. Despite what a lot of people think, high blood pressure usually doesn't cause headaches or make you feel dizzy or lightheaded. It usually has no symptoms. But it does increase your risk of stroke, heart attack, and other problems. You and your doctor will talk about your risks of these problems based on your blood pressure. Your doctor will give you a goal for your blood pressure. Your goal will be based on your health and your age. Lifestyle changes, such as eating healthy and being active, are always important to help lower blood pressure. You might also take medicine to reach your blood pressure goal.  Follow-up care is a key part of your treatment and safety. Be sure to make and go to all appointments, and call your doctor if you are having problems. It's also a good idea to know your test results and keep a list of the medicines you take. How can you care for yourself at home? Medical treatment  · If you stop taking your medicine, your blood pressure will go back up. You may take one or more types of medicine to lower your blood pressure. Be safe with medicines. Take your medicine exactly as prescribed. Call your doctor if you think you are having a problem with your medicine. · Talk to your doctor before you start taking aspirin every day. Aspirin can help certain people lower their risk of a heart attack or stroke. But taking aspirin isn't right for everyone, because it can cause serious bleeding. · See your doctor regularly. You may need to see the doctor more often at first or until your blood pressure comes down. · If you are taking blood pressure medicine, talk to your doctor before you take decongestants or anti-inflammatory medicine, such as ibuprofen.  Some of these medicines can raise blood pressure. · Learn how to check your blood pressure at home. Lifestyle changes  · Stay at a healthy weight. This is especially important if you put on weight around the waist. Losing even 10 pounds can help you lower your blood pressure. · If your doctor recommends it, get more exercise. Walking is a good choice. Bit by bit, increase the amount you walk every day. Try for at least 30 minutes on most days of the week. You also may want to swim, bike, or do other activities. · Avoid or limit alcohol. Talk to your doctor about whether you can drink any alcohol. · Try to limit how much sodium you eat to less than 2,300 milligrams (mg) a day. Your doctor may ask you to try to eat less than 1,500 mg a day. · Eat plenty of fruits (such as bananas and oranges), vegetables, legumes, whole grains, and low-fat dairy products. · Lower the amount of saturated fat in your diet. Saturated fat is found in animal products such as milk, cheese, and meat. Limiting these foods may help you lose weight and also lower your risk for heart disease. · Do not smoke. Smoking increases your risk for heart attack and stroke. If you need help quitting, talk to your doctor about stop-smoking programs and medicines. These can increase your chances of quitting for good. When should you call for help? Call 911 anytime you think you may need emergency care. This may mean having symptoms that suggest that your blood pressure is causing a serious heart or blood vessel problem. Your blood pressure may be over 180/120.   For example, call 911 if:    · You have symptoms of a heart attack. These may include:  ? Chest pain or pressure, or a strange feeling in the chest.  ? Sweating. ? Shortness of breath. ? Nausea or vomiting. ? Pain, pressure, or a strange feeling in the back, neck, jaw, or upper belly or in one or both shoulders or arms. ? Lightheadedness or sudden weakness.   ? A fast or irregular heartbeat.     · You have symptoms of a stroke. These may include:  ? Sudden numbness, tingling, weakness, or loss of movement in your face, arm, or leg, especially on only one side of your body. ? Sudden vision changes. ? Sudden trouble speaking. ? Sudden confusion or trouble understanding simple statements. ? Sudden problems with walking or balance. ? A sudden, severe headache that is different from past headaches.     · You have severe back or belly pain.    Do not wait until your blood pressure comes down on its own. Get help right away.   Call your doctor now or seek immediate care if:    · Your blood pressure is much higher than normal (such as 180/120 or higher), but you don't have symptoms.     · You think high blood pressure is causing symptoms, such as:  ? Severe headache.  ? Blurry vision.    Watch closely for changes in your health, and be sure to contact your doctor if:    · Your blood pressure measures higher than your doctor recommends at least 2 times. That means the top number is higher or the bottom number is higher, or both.     · You think you may be having side effects from your blood pressure medicine. Where can you learn more? Go to http://azael-maritza.info/. Enter S786 in the search box to learn more about \"High Blood Pressure: Care Instructions. \"  Current as of: July 22, 2018  Content Version: 11.9  © 8386-1994 [employer]. Care instructions adapted under license by [employer] (which disclaims liability or warranty for this information). If you have questions about a medical condition or this instruction, always ask your healthcare professional. [place] [address] any warranty or liability for your use of this information.

## 2019-05-05 LAB — BACTERIA UR CULT: ABNORMAL

## 2019-05-24 ENCOUNTER — OFFICE VISIT (OUTPATIENT)
Dept: FAMILY MEDICINE CLINIC | Age: 29
End: 2019-05-24

## 2019-05-24 VITALS
BODY MASS INDEX: 38.76 KG/M2 | HEART RATE: 75 BPM | TEMPERATURE: 98.3 F | SYSTOLIC BLOOD PRESSURE: 120 MMHG | OXYGEN SATURATION: 100 % | RESPIRATION RATE: 18 BRPM | DIASTOLIC BLOOD PRESSURE: 63 MMHG | HEIGHT: 64 IN | WEIGHT: 227 LBS

## 2019-05-24 DIAGNOSIS — G47.00 INSOMNIA, UNSPECIFIED TYPE: ICD-10-CM

## 2019-05-24 DIAGNOSIS — I10 ESSENTIAL HYPERTENSION: Primary | ICD-10-CM

## 2019-05-24 RX ORDER — NIFEDIPINE 60 MG/1
60 TABLET, EXTENDED RELEASE ORAL DAILY
Qty: 90 TAB | Refills: 1 | Status: SHIPPED | OUTPATIENT
Start: 2019-06-07 | End: 2019-09-18 | Stop reason: SDUPTHER

## 2019-05-24 RX ORDER — TRAZODONE HYDROCHLORIDE 100 MG/1
100 TABLET ORAL
Qty: 90 TAB | Refills: 1 | Status: SHIPPED | OUTPATIENT
Start: 2019-05-24 | End: 2019-12-30 | Stop reason: SDUPTHER

## 2019-05-24 NOTE — PROGRESS NOTES
Derick Chen  29 y.o. female  1990  [address] [facility] [address]  [id number]     [facility]: Progress Note       Encounter Date: 5/24/2019    Chief Complaint   Patient presents with    Hypertension       History provided by patient  History of Present Illness   Derick Chen is a 29 y.o. female who presents to clinic today for:    Hypertension: Stable   BP Readings from Last 3 Encounters:   05/24/19 120/63   05/03/19 (!) 131/93   01/23/19 140/84     The patient reports:  taking medications as instructed, no medication side effects noted, no TIA's, no chest pain on exertion, no dyspnea on exertion, no swelling of ankles. Home monitoring:No  Sodium   Date Value Ref Range Status   01/23/2019 139 134 - 144 mmol/L Final     Potassium   Date Value Ref Range Status   01/23/2019 4.3 3.5 - 5.2 mmol/L Final     Creatinine   Date Value Ref Range Status   01/23/2019 0.91 0.57 - 1.00 mg/dL Final   05/11/2018 0.83 0.57 - 1.00 mg/dL Final       Our goal is to normalize the blood pressure to decrease the risks of strokes and heart attacks. The patient is in agreement with the plan. Insomnia  Patient present with cc of insomnia. She reports doing very well on       Health Maintenance  Patient had vaccines at work. Health Maintenance Due   Topic Date Due    DTaP/Tdap/Td series (1 - Tdap) 06/04/2011     Review of Systems   Review of Systems   Constitutional: Negative for chills and fever. Skin: Negative for itching and rash. Neurological: Negative for dizziness, seizures, loss of consciousness and headaches. Vitals/Objective:     Vitals:    05/24/19 0959   BP: 120/63   Pulse: 75   Resp: 18   Temp: 98.3 °F (36.8 °C)   TempSrc: Oral   SpO2: 100%   Weight: 227 lb (103 kg)   Height: 5' 4\" (1.626 m)     Body mass index is 38.96 kg/m².     Wt Readings from Last 3 Encounters:   05/24/19 227 lb (103 kg)   05/03/19 227 lb (103 kg) 01/23/19 226 lb (102.5 kg)       Physical Exam   Constitutional: She is oriented to person, place, and time. She appears well-developed and well-nourished. HENT:   Head: Normocephalic and atraumatic. Cardiovascular: Normal rate and regular rhythm. No murmur heard. Pulmonary/Chest: Effort normal and breath sounds normal. No stridor. No respiratory distress. She has no wheezes. Neurological: She is alert and oriented to person, place, and time. Skin: Skin is warm. No rash noted. No erythema. No results found for this or any previous visit (from the past 24 hour(s)). Assessment and Plan:     Encounter Diagnoses     ICD-10-CM ICD-9-CM   1. Essential hypertension I10 401.9   2. Insomnia, unspecified type G47.00 780.52       1. Essential hypertension  Well controlled hypertension on current medication.   - NIFEdipine ER (ADALAT CC) 60 mg ER tablet; Take 1 Tab by mouth daily. Indications: high blood pressure  Dispense: 90 Tab; Refill: 1    2. Insomnia, unspecified type  Doing well continue medication. - traZODone (DESYREL) 100 mg tablet; Take 1 Tab by mouth nightly. Indications: insomnia  Dispense: 90 Tab; Refill: 1      I have discussed the diagnosis with the patient and the intended plan as seen in the above orders. she has expressed understanding. The patient has received an after-visit summary and questions were answered concerning future plans. I have discussed medication side effects and warnings with the patient as well. Electronically Signed: Gini Coates MD     History/Allergies   Patients past medical, surgical and family histories were reviewed and updated.     Past Medical History:   Diagnosis Date    Abnormal chest x-ray     Abnormal Pap smear     Encounter for IUD insertion 03/28/2017    mirena replaced    HPV vaccine counseling     Pt completed Gardasil series    HSV-2 infection     PPD positive       Past Surgical History:   Procedure Laterality Date    HX COLPOSCOPY 12/09    negative    HX HEENT  2015    wisdom tooth extraction     Family History   Problem Relation Age of Onset    Anemia Mother     Sickle Cell Anemia Sister     Sickle Cell Trait Sister     Hypertension Father     Sickle Cell Trait Father     Clotting Disorder Father     Hypertension Paternal Grandmother     Lupus Other      Social History     Tobacco Use    Smoking status: Never Smoker    Smokeless tobacco: Never Used   Substance Use Topics    Alcohol use: No    Drug use: No          Allergies   Allergen Reactions    Codeine Hives and Itching    Lisinopril-Hydrochlorothiazide Cough     Patient states she cannot take because she could not stop coughing.  Pcn [Penicillins] Hives and Itching       Disposition     Follow-up and Dispositions  ·   Return in about 6 months (around 11/24/2019) for Routine (Chronic Conditions). No future appointments. Current Medications after this visit     Current Outpatient Medications   Medication Sig    [START ON 6/7/2019] NIFEdipine ER (ADALAT CC) 60 mg ER tablet Take 1 Tab by mouth daily. Indications: high blood pressure    traZODone (DESYREL) 100 mg tablet Take 1 Tab by mouth nightly. Indications: insomnia    NIFEdipine ER (ADALAT CC) 30 mg ER tablet Take 1 Tab by mouth daily for 14 days, THEN 2 Tabs daily for 21 days.  levonorgestrel (MIRENA) 20 mcg/24 hr (5 years) IUD 1 Device by IntraUTERine route once. No current facility-administered medications for this visit.       Medications Discontinued During This Encounter   Medication Reason    nitrofurantoin, macrocrystal-monohydrate, (MACROBID) 100 mg capsule Therapy Completed    traZODone (DESYREL) 50 mg tablet Reorder    diphenhydrAMINE (BENADRYL) 25 mg capsule Not A Current Medication

## 2019-05-24 NOTE — PATIENT INSTRUCTIONS
DASH Diet: Care Instructions Your Care Instructions The DASH diet is an eating plan that can help lower your blood pressure. DASH stands for Dietary Approaches to Stop Hypertension. Hypertension is high blood pressure. The DASH diet focuses on eating foods that are high in calcium, potassium, and magnesium. These nutrients can lower blood pressure. The foods that are highest in these nutrients are fruits, vegetables, low-fat dairy products, nuts, seeds, and legumes. But taking calcium, potassium, and magnesium supplements instead of eating foods that are high in those nutrients does not have the same effect. The DASH diet also includes whole grains, fish, and poultry. The DASH diet is one of several lifestyle changes your doctor may recommend to lower your high blood pressure. Your doctor may also want you to decrease the amount of sodium in your diet. Lowering sodium while following the DASH diet can lower blood pressure even further than just the DASH diet alone. Follow-up care is a key part of your treatment and safety. Be sure to make and go to all appointments, and call your doctor if you are having problems. It's also a good idea to know your test results and keep a list of the medicines you take. How can you care for yourself at home? Following the DASH diet · Eat 4 to 5 servings of fruit each day. A serving is 1 medium-sized piece of fruit, ½ cup chopped or canned fruit, 1/4 cup dried fruit, or 4 ounces (½ cup) of fruit juice. Choose fruit more often than fruit juice. · Eat 4 to 5 servings of vegetables each day. A serving is 1 cup of lettuce or raw leafy vegetables, ½ cup of chopped or cooked vegetables, or 4 ounces (½ cup) of vegetable juice. Choose vegetables more often than vegetable juice. · Get 2 to 3 servings of low-fat and fat-free dairy each day. A serving is 8 ounces of milk, 1 cup of yogurt, or 1 ½ ounces of cheese. · Eat 6 to 8 servings of grains each day. A serving is 1 slice of bread, 1 ounce of dry cereal, or ½ cup of cooked rice, pasta, or cooked cereal. Try to choose whole-grain products as much as possible. · Limit lean meat, poultry, and fish to 2 servings each day. A serving is 3 ounces, about the size of a deck of cards. · Eat 4 to 5 servings of nuts, seeds, and legumes (cooked dried beans, lentils, and split peas) each week. A serving is 1/3 cup of nuts, 2 tablespoons of seeds, or ½ cup of cooked beans or peas. · Limit fats and oils to 2 to 3 servings each day. A serving is 1 teaspoon of vegetable oil or 2 tablespoons of salad dressing. · Limit sweets and added sugars to 5 servings or less a week. A serving is 1 tablespoon jelly or jam, ½ cup sorbet, or 1 cup of lemonade. · Eat less than 2,300 milligrams (mg) of sodium a day. If you limit your sodium to 1,500 mg a day, you can lower your blood pressure even more. Tips for success · Start small. Do not try to make dramatic changes to your diet all at once. You might feel that you are missing out on your favorite foods and then be more likely to not follow the plan. Make small changes, and stick with them. Once those changes become habit, add a few more changes. · Try some of the following: ? Make it a goal to eat a fruit or vegetable at every meal and at snacks. This will make it easy to get the recommended amount of fruits and vegetables each day. ? Try yogurt topped with fruit and nuts for a snack or healthy dessert. ? Add lettuce, tomato, cucumber, and onion to sandwiches. ? Combine a ready-made pizza crust with low-fat mozzarella cheese and lots of vegetable toppings. Try using tomatoes, squash, spinach, broccoli, carrots, cauliflower, and onions. ? Have a variety of cut-up vegetables with a low-fat dip as an appetizer instead of chips and dip. ? Sprinkle sunflower seeds or chopped almonds over salads.  Or try adding chopped walnuts or almonds to cooked vegetables. ? Try some vegetarian meals using beans and peas. Add garbanzo or kidney beans to salads. Make burritos and tacos with mashed scott beans or black beans. Where can you learn more? Go to http://azael-maritza.info/. Enter N184 in the search box to learn more about \"DASH Diet: Care Instructions. \" Current as of: July 22, 2018 Content Version: 11.9 © 7662-4444 [employer]. Care instructions adapted under license by [employer] (which disclaims liability or warranty for this information). If you have questions about a medical condition or this instruction, always ask your healthcare professional. [address] any warranty or liability for your use of this information.

## 2019-07-02 ENCOUNTER — OFFICE VISIT (OUTPATIENT)
Dept: OBGYN CLINIC | Age: 29
End: 2019-07-02

## 2019-07-02 VITALS
HEIGHT: 64 IN | DIASTOLIC BLOOD PRESSURE: 79 MMHG | SYSTOLIC BLOOD PRESSURE: 122 MMHG | BODY MASS INDEX: 36.7 KG/M2 | WEIGHT: 215 LBS

## 2019-07-02 DIAGNOSIS — Z01.419 ENCOUNTER FOR GYNECOLOGICAL EXAMINATION WITHOUT ABNORMAL FINDING: Primary | ICD-10-CM

## 2019-07-02 DIAGNOSIS — Z20.2 EXPOSURE TO CHLAMYDIA: ICD-10-CM

## 2019-07-02 RX ORDER — AZITHROMYCIN 500 MG/1
1000 TABLET, FILM COATED ORAL
Qty: 2 TAB | Refills: 0 | Status: SHIPPED | OUTPATIENT
Start: 2019-07-02 | End: 2019-07-02

## 2019-07-02 NOTE — PATIENT INSTRUCTIONS

## 2019-07-02 NOTE — PROGRESS NOTES
Omar Thrasher is a ,  34 y.o. female [address] whose No LMP recorded. (Menstrual status: IUD). was on  who presents for her annual checkup. She requests std screening. BF tested pos for chlamydia    With regard to the Gardisil vaccine, she has received all 3 injections. Menstrual status:    Her periods are minimal to nonexistent in flow. She is using essentially none pads or tampons per day, minimal to none using Mirena. She denies dysmenorrhea. She reports no premenstrual symptoms. Contraception:    The current method of family planning is IUD and She declines contraception and counseling. Sexual history:    She  reports that she currently engages in sexual activity and has had partners who are Male. She reports using the following method of birth control/protection: IUD. Medical conditions:    Since her last annual GYN exam about 18 ago, she has not the following changes in her health history: none. Pap and Mammogram History:    Her most recent Pap smear was normal/-HPV obtained 17 year(s) ago. The patient has never had a mammogram.    The patient does not have a family history of breast cancer. Past Medical History:   Diagnosis Date    Abnormal chest x-ray     Abnormal Pap smear     Encounter for IUD insertion 2017    mirena replaced    HPV vaccine counseling     Pt completed Gardasil series    HSV-2 infection     PPD positive      Past Surgical History:   Procedure Laterality Date    HX COLPOSCOPY      negative    HX HEENT      wisdom tooth extraction       Current Outpatient Medications   Medication Sig Dispense Refill    NIFEdipine ER (ADALAT CC) 60 mg ER tablet Take 1 Tab by mouth daily. Indications: high blood pressure 90 Tab 1    traZODone (DESYREL) 100 mg tablet Take 1 Tab by mouth nightly. Indications: insomnia 90 Tab 1    levonorgestrel (MIRENA) 20 mcg/24 hr (5 years) IUD 1 Device by IntraUTERine route once. Allergies: Codeine; Lisinopril-hydrochlorothiazide; and Pcn [penicillins]   Social History     Socioeconomic History    Marital status: SINGLE     Spouse name: Not on file    Number of children: Not on file    Years of education: Not on file    Highest education level: Not on file   Occupational History    Not on file   Social Needs    Financial resource strain: Not on file    Food insecurity:     Worry: Not on file     Inability: Not on file    Transportation needs:     Medical: Not on file     Non-medical: Not on file   Tobacco Use    Smoking status: Never Smoker    Smokeless tobacco: Never Used   Substance and Sexual Activity    Alcohol use: No    Drug use: No    Sexual activity: Yes     Partners: Male     Birth control/protection: IUD   Lifestyle    Physical activity:     Days per week: Not on file     Minutes per session: Not on file    Stress: Not on file   Relationships    Social connections:     Talks on phone: Not on file     Gets together: Not on file     Attends Jainism service: Not on file     Active member of club or organization: Not on file     Attends meetings of clubs or organizations: Not on file     Relationship status: Not on file    Intimate partner violence:     Fear of current or ex partner: Not on file     Emotionally abused: Not on file     Physically abused: Not on file     Forced sexual activity: Not on file   Other Topics Concern    Not on file   Social History Narrative    Not on file     Tobacco History:  reports that she has never smoked. She has never used smokeless tobacco.  Alcohol Abuse:  reports that she does not drink alcohol. Drug Abuse:  reports that she does not use drugs. Patient Active Problem List   Diagnosis Code    Essential hypertension I10    Severe obesity (BMI 35.0-39. 9) E66.01       Review of Systems - History obtained from the patient  Constitutional: negative for weight loss, fever, night sweats  HEENT: negative for hearing loss, earache, congestion, snoring, sorethroat  CV: negative for chest pain, palpitations, edema  Resp: negative for cough, shortness of breath, wheezing  GI: negative for change in bowel habits, abdominal pain, black or bloody stools  : negative for frequency, dysuria, hematuria, vaginal discharge  MSK: negative for back pain, joint pain, muscle pain  Breast: negative for breast lumps, nipple discharge, galactorrhea  Skin :negative for itching, rash, hives  Neuro: negative for dizziness, headache, confusion, weakness  Psych: negative for anxiety, depression, change in mood  Heme/lymph: negative for bleeding, bruising, pallor    Physical Exam    There were no vitals taken for this visit.     Constitutional  · Appearance: well-nourished, well developed, alert, in no acute distress    HENT  · Head and Face: appears normal    Neck  · Inspection/Palpation: normal appearance, no masses or tenderness  · Lymph Nodes: no lymphadenopathy present  · Thyroid: gland size normal, nontender, no nodules or masses present on palpation    Chest  · Respiratory Effort: breathing normal  · Auscultation: normal breath sounds    Cardiovascular  · Heart:  · Auscultation: regular rate and rhythm without murmur    Breasts  · Inspection of Breasts: breasts symmetrical, no skin changes, no discharge present, nipple appearance normal, no skin retraction present  · Palpation of Breasts and Axillae: no masses present on palpation, no breast tenderness  · Axillary Lymph Nodes: no lymphadenopathy present    Gastrointestinal  · Abdominal Examination: abdomen non-tender to palpation, normal bowel sounds, no masses present  · Liver and spleen: no hepatomegaly present, spleen not palpable  · Hernias: no hernias identified    Genitourinary  · External Genitalia: normal appearance for age, no discharge present, no tenderness present, no inflammatory lesions present, no masses present, no atrophy present  · Vagina: normal vaginal vault without central or paravaginal defects, no discharge present, no inflammatory lesions present, no masses present  · Bladder: non-tender to palpation  · Urethra: appears normal  · Cervix: normal string not seen   · Uterus: normal size, shape and consistency  · Adnexa: no adnexal tenderness present, no adnexal masses present  · Perineum: perineum within normal limits, no evidence of trauma, no rashes or skin lesions present  · Anus: anus within normal limits, no hemorrhoids present  · Inguinal Lymph Nodes: no lymphadenopathy present    Skin  · General Inspection: no rash, no lesions identified    Neurologic/Psychiatric  · Mental Status:  · Orientation: grossly oriented to person, place and time  · Mood and Affect: mood normal, affect appropriate    .   Assessment:  Routine gynecologic examination  exosure to chlamydia    Plan:  Counseled re: diet, exercise, healthy lifestyle  Return for yearly wellness visits  [facility]  Blood STD testing  Zithromax 1g now

## 2019-07-04 LAB
C TRACH RRNA SPEC QL NAA+PROBE: POSITIVE
COMMENT, 144067: NORMAL
HBV SURFACE AG SERPL QL IA: NEGATIVE
HCV AB S/CO SERPL IA: <0.1 S/CO RATIO (ref 0–0.9)
HIV 1+2 AB+HIV1 P24 AG SERPL QL IA: NON REACTIVE
HSV2 IGG SER IA-ACNC: 16.1 INDEX (ref 0–0.9)
N GONORRHOEA RRNA SPEC QL NAA+PROBE: NEGATIVE
RPR SER QL: NON REACTIVE
T VAGINALIS RRNA VAG QL NAA+PROBE: NEGATIVE

## 2019-07-05 NOTE — PROGRESS NOTES
Added to chart, pmh. Reported to [facility]. Patient notified of results via [employer].  Tickled X 3 months

## 2019-09-18 DIAGNOSIS — I10 ESSENTIAL HYPERTENSION: ICD-10-CM

## 2019-09-19 RX ORDER — NIFEDIPINE 60 MG/1
TABLET, EXTENDED RELEASE ORAL
Qty: 30 TAB | Refills: 0 | Status: SHIPPED | OUTPATIENT
Start: 2019-09-19 | End: 2020-01-28 | Stop reason: SDUPTHER

## 2019-09-30 ENCOUNTER — OFFICE VISIT (OUTPATIENT)
Dept: OBGYN CLINIC | Age: 29
End: 2019-09-30

## 2019-09-30 VITALS
BODY MASS INDEX: 38.07 KG/M2 | WEIGHT: 223 LBS | HEIGHT: 64 IN | DIASTOLIC BLOOD PRESSURE: 82 MMHG | SYSTOLIC BLOOD PRESSURE: 131 MMHG

## 2019-09-30 DIAGNOSIS — Z20.2 POSSIBLE EXPOSURE TO STD: ICD-10-CM

## 2019-09-30 DIAGNOSIS — Z86.19 HISTORY OF CHLAMYDIA: Primary | ICD-10-CM

## 2019-09-30 NOTE — PROGRESS NOTES
Chief Complaint   Follow-up      HPI  Ginger Saldaña is a 34 y.o. female who presents for a 3 month follow up visit. No LMP recorded. (Menstrual status: IUD). She tested positive for Chlamydia on 7/2/19. Patient states she did complete medication. Associated symptoms: none. Aggravating symptoms: none. Alleviating factors: none. The patient denies additional symptoms and concerns. Past Medical History:   Diagnosis Date    Abnormal chest x-ray     Abnormal Pap smear     Encounter for IUD insertion 03/28/2017    mirena replaced    HPV vaccine counseling     Pt completed Gardasil series    HSV-2 infection     PPD positive      Past Surgical History:   Procedure Laterality Date    HX COLPOSCOPY  12/09    negative    HX HEENT  2015    wisdom tooth extraction     Social History     Occupational History    Not on file   Tobacco Use    Smoking status: Never Smoker    Smokeless tobacco: Never Used   Substance and Sexual Activity    Alcohol use: No    Drug use: No    Sexual activity: Yes     Partners: Male     Birth control/protection: IUD     Family History   Problem Relation Age of Onset    Anemia Mother     Sickle Cell Anemia Sister     Sickle Cell Trait Sister     Hypertension Father     Sickle Cell Trait Father     Clotting Disorder Father     Hypertension Paternal Grandmother     Lupus Other        Allergies   Allergen Reactions    Codeine Hives and Itching    Lisinopril-Hydrochlorothiazide Cough     Patient states she cannot take because she could not stop coughing.  Pcn [Penicillins] Hives and Itching     Prior to Admission medications    Medication Sig Start Date End Date Taking? Authorizing Provider   NIFEdipine ER (ADALAT CC) 60 mg ER tablet TAKE 1 TABLET BY MOUTH DAILY FOR HIGH BLOOD PRESSURE 9/19/19  Yes Nirmal Matnilla MD   traZODone (DESYREL) 100 mg tablet Take 1 Tab by mouth nightly.  Indications: insomnia 5/24/19  Yes Nirmal Mantilla MD   levonorgestrel (MIRENA) 20 mcg/24 hr (5 years) IUD 1 Device by IntraUTERine route once.     Provider, Historical        Review of Systems: History obtained from the patient  Constitutional: negative for weight loss, fever, night sweats  HEENT: negative for hearing loss, earache, congestion, snoring, sorethroat  CV: negative for chest pain, palpitations, edema  Resp: negative for cough, shortness of breath, wheezing  Breast: negative for breast lumps, nipple discharge, galactorrhea  GI: negative for change in bowel habits, abdominal pain, black or bloody stools  : negative for frequency, dysuria, hematuria, vaginal discharge  MSK: negative for back pain, joint pain, muscle pain  Skin: negative for itching, rash, hives  Neuro: negative for dizziness, headache, confusion, weakness  Psych: negative for anxiety, depression, change in mood  Heme/lymph: negative for bleeding, bruising, pallor    Objective:  Visit Vitals  /82 (BP 1 Location: Right arm, BP Patient Position: Sitting)   Ht 5' 4\" (1.626 m)   Wt 223 lb (101.2 kg)   BMI 38.28 kg/m²       Physical Exam:   PHYSICAL EXAMINATION    Constitutional  · Appearance: well-nourished, well developed, alert, in no acute distress    HENT  · Head and Face: appears normal    Neck  · Inspection/Palpation: normal appearance, no masses or tenderness  · Lymph Nodes: no lymphadenopathy present  · Thyroid: gland size normal, nontender, no nodules or masses present on palpation    Chest  · Respiratory Effort: breathing labored  · Auscultation: normal breath sounds    Cardiovascular  · Heart:  · Auscultation: regular rate and rhythm without murmur    Breasts  · Inspection of Breasts: breasts symmetrical, no skin changes, no discharge present, nipple appearance normal, no skin retraction present  · Palpation of Breasts and Axillae: no masses present on palpation, no breast tenderness  · Axillary Lymph Nodes: no lymphadenopathy present    Gastrointestinal  · Abdominal Examination: abdomen non-tender to palpation, normal bowel sounds, no masses present  · Liver and spleen: no hepatomegaly present, spleen not palpable  · Hernias: no hernias identified    Genitourinary  · External Genitalia: normal appearance for age, no discharge present, no tenderness present, no inflammatory lesions present, no masses present, no atrophy present  · Vagina: normal vaginal vault without central or paravaginal defects, no discharge present, no inflammatory lesions present, no masses present  · Bladder: non-tender to palpation  · Urethra: appears normal  · Cervix: normal   · Uterus: normal size, shape and consistency  · Adnexa: no adnexal tenderness present, no adnexal masses present  · Perineum: perineum within normal limits, no evidence of trauma, no rashes or skin lesions present  · Anus: anus within normal limits, no hemorrhoids present  · Inguinal Lymph Nodes: no lymphadenopathy present    Skin  · General Inspection: no rash, no lesions identified    Neurologic/Psychiatric  · Mental Status:  · Orientation: grossly oriented to person, place and time  · Mood and Affect: mood normal, affect appropriate    Assessment:   Hx of chlamydia    Plan:   Desires blood STD testing  GCC today      RTO prn if symptoms persist or worsen. Instructions given to pt. Handouts given to pt.

## 2019-09-30 NOTE — PATIENT INSTRUCTIONS
Well Visit, Ages 25 to 48: Care Instructions  Your Care Instructions    Physical exams can help you stay healthy. Your doctor has checked your overall health and may have suggested ways to take good care of yourself. He or she also may have recommended tests. At home, you can help prevent illness with healthy eating, regular exercise, and other steps. Follow-up care is a key part of your treatment and safety. Be sure to make and go to all appointments, and call your doctor if you are having problems. It's also a good idea to know your test results and keep a list of the medicines you take. How can you care for yourself at home? · Reach and stay at a healthy weight. This will lower your risk for many problems, such as obesity, diabetes, heart disease, and high blood pressure. · Get at least 30 minutes of physical activity on most days of the week. Walking is a good choice. You also may want to do other activities, such as running, swimming, cycling, or playing tennis or team sports. Discuss any changes in your exercise program with your doctor. · Do not smoke or allow others to smoke around you. If you need help quitting, talk to your doctor about stop-smoking programs and medicines. These can increase your chances of quitting for good. · Talk to your doctor about whether you have any risk factors for sexually transmitted infections (STIs). Having one sex partner (who does not have STIs and does not have sex with anyone else) is a good way to avoid these infections. · Use birth control if you do not want to have children at this time. Talk with your doctor about the choices available and what might be best for you. · Protect your skin from too much sun. When you're outdoors from 10 a.m. to 4 p.m., stay in the shade or cover up with clothing and a hat with a wide brim. Wear sunglasses that block UV rays. Even when it's cloudy, put broad-spectrum sunscreen (SPF 30 or higher) on any exposed skin.   · See a dentist one or two times a year for checkups and to have your teeth cleaned. · Wear a seat belt in the car. Follow your doctor's advice about when to have certain tests. These tests can spot problems early. For everyone  · Cholesterol. Have the fat (cholesterol) in your blood tested after age 21. Your doctor will tell you how often to have this done based on your age, family history, or other things that can increase your risk for heart disease. · Blood pressure. Have your blood pressure checked during a routine doctor visit. Your doctor will tell you how often to check your blood pressure based on your age, your blood pressure results, and other factors. · Vision. Talk with your doctor about how often to have a glaucoma test.  · Diabetes. Ask your doctor whether you should have tests for diabetes. · Colon cancer. Your risk for colorectal cancer gets higher as you get older. Some experts say that adults should start regular screening at age 48 and stop at age 76. Others say to start before age 48 or continue after age 76. Talk with your doctor about your risk and when to start and stop screening. For women  · Breast exam and mammogram. Talk to your doctor about when you should have a clinical breast exam and a mammogram. Medical experts differ on whether and how often women under 50 should have these tests. Your doctor can help you decide what is right for you. · Cervical cancer screening test and pelvic exam. Begin with a Pap test at age 24. The test often is part of a pelvic exam. Starting at age 27, you may choose to have a Pap test, an HPV test, or both tests at the same time (called co-testing). Talk with your doctor about how often to have testing. · Tests for sexually transmitted infections (STIs). Ask whether you should have tests for STIs. You may be at risk if you have sex with more than one person, especially if your partners do not wear condoms.   For men  · Tests for sexually transmitted infections (STIs). Ask whether you should have tests for STIs. You may be at risk if you have sex with more than one person, especially if you do not wear a condom. · Testicular cancer exam. Ask your doctor whether you should check your testicles regularly. · Prostate exam. Talk to your doctor about whether you should have a blood test (called a PSA test) for prostate cancer. Experts differ on whether and when men should have this test. Some experts suggest it if you are older than 39 and are -American or have a father or brother who got prostate cancer when he was younger than 72. When should you call for help? Watch closely for changes in your health, and be sure to contact your doctor if you have any problems or symptoms that concern you. Where can you learn more? Go to http://azael-maritza.info/. Enter P072 in the search box to learn more about \"Well Visit, Ages 25 to 48: Care Instructions. \"  Current as of: December 13, 2018  Content Version: 12.2  © 5722-8363 [employer]. Care instructions adapted under license by [employer] (which disclaims liability or warranty for this information). If you have questions about a medical condition or this instruction, always ask your healthcare professional. [place] [address] any warranty or liability for your use of this information.

## 2019-10-01 LAB
COMMENT, 144067: NORMAL
HBV SURFACE AG SERPL QL IA: NEGATIVE
HCV AB S/CO SERPL IA: <0.1 S/CO RATIO (ref 0–0.9)
HIV 1+2 AB+HIV1 P24 AG SERPL QL IA: NON REACTIVE
HSV2 IGG SER IA-ACNC: 14 INDEX (ref 0–0.9)
RPR SER QL: NON REACTIVE

## 2019-10-04 LAB
C TRACH RRNA SPEC QL NAA+PROBE: NEGATIVE
N GONORRHOEA RRNA SPEC QL NAA+PROBE: NEGATIVE
T VAGINALIS DNA SPEC QL NAA+PROBE: NEGATIVE

## 2019-12-30 DIAGNOSIS — G47.00 INSOMNIA, UNSPECIFIED TYPE: ICD-10-CM

## 2019-12-30 RX ORDER — TRAZODONE HYDROCHLORIDE 100 MG/1
100 TABLET ORAL
Qty: 90 TAB | Refills: 1 | Status: SHIPPED | OUTPATIENT
Start: 2019-12-30

## 2019-12-30 NOTE — TELEPHONE ENCOUNTER
Caller's first/last name:  [facility]     Name and dosage of medication:  Trazodone 100 mg    Name of Pharmacy:  [facility]     Best contact number:  [phone]    Further clarification of call:      Refill request received from [facility]

## 2020-09-14 NOTE — PROGRESS NOTES
Padmini Cummins is a ,  27 y.o. female [address]     who presents for her annual checkup. She is having no significant problems. Would like to discuss concerns with blood pressure medication. Was switch to a more pregnancy friendly medication Procardia but was dizzy. Now on labetalol but her resting pulse is in the 40's (asx)    With regard to the Gardisil vaccine, she has received all 3 injections. Menstrual status:    Her periods are nonexistent in flow due to IUD    She denies dysmenorrhea. She reports no premenstrual symptoms. Contraception:    The current method of family planning is IUD and She declines contraception and counseling. Sexual history:    She  reports being sexually active and has had partner(s) who are Male. She reports using the following method of birth control/protection: I.U.D..    Medical conditions:    Since her last annual GYN exam about one year ago, she has not the following changes in her health history: none. Pap and Mammogram History:    Her most recent Pap smear was 2017 neg    The patient has never had a mammogram.    The patient does not have a family history of breast cancer. Past Medical History:   Diagnosis Date    Abnormal chest x-ray     Abnormal Pap smear     Encounter for IUD insertion 2017    mirena replaced    HPV vaccine counseling     Pt completed Gardasil series    HSV-2 infection     PPD positive      Past Surgical History:   Procedure Laterality Date    HX COLPOSCOPY      negative    HX HEENT      wisdom tooth extraction       Current Outpatient Medications   Medication Sig Dispense Refill    NIFEdipine ER (ADALAT CC) 60 mg ER tablet TAKE 1 TABLET BY MOUTH DAILY FOR HIGH BLOOD PRESSURE 30 Tab 0    traZODone (DESYREL) 100 mg tablet Take 1 Tab by mouth nightly. Indications: insomnia 90 Tab 1    levonorgestrel (MIRENA) 20 mcg/24 hr (5 years) IUD 1 Device by IntraUTERine route once. Allergies: Codeine; Lisinopril-hydrochlorothiazide; and Pcn [penicillins]   Social History     Socioeconomic History    Marital status: SINGLE     Spouse name: Not on file    Number of children: Not on file    Years of education: Not on file    Highest education level: Not on file   Occupational History    Not on file   Social Needs    Financial resource strain: Not on file    Food insecurity     Worry: Not on file     Inability: Not on file    Transportation needs     Medical: Not on file     Non-medical: Not on file   Tobacco Use    Smoking status: Never Smoker    Smokeless tobacco: Never Used   Substance and Sexual Activity    Alcohol use: No    Drug use: No    Sexual activity: Yes     Partners: Male     Birth control/protection: I.U.D. Lifestyle    Physical activity     Days per week: Not on file     Minutes per session: Not on file    Stress: Not on file   Relationships    Social connections     Talks on phone: Not on file     Gets together: Not on file     Attends Mandaeism service: Not on file     Active member of club or organization: Not on file     Attends meetings of clubs or organizations: Not on file     Relationship status: Not on file    Intimate partner violence     Fear of current or ex partner: Not on file     Emotionally abused: Not on file     Physically abused: Not on file     Forced sexual activity: Not on file   Other Topics Concern    Not on file   Social History Narrative    Not on file     Tobacco History:  reports that she has never smoked. She has never used smokeless tobacco.  Alcohol Abuse:  reports no history of alcohol use. Drug Abuse:  reports no history of drug use. Patient Active Problem List   Diagnosis Code    Essential hypertension I10    Severe obesity (BMI 35.0-39. 9) E66.01       Review of Systems - History obtained from the patient  Constitutional: negative for weight loss, fever, night sweats  HEENT: negative for hearing loss, earache, congestion, snoring, sorethroat  CV: negative for chest pain, palpitations, edema  Resp: negative for cough, shortness of breath, wheezing  GI: negative for change in bowel habits, abdominal pain, black or bloody stools  : negative for frequency, dysuria, hematuria, vaginal discharge  MSK: negative for back pain, joint pain, muscle pain  Breast: negative for breast lumps, nipple discharge, galactorrhea  Skin :negative for itching, rash, hives  Neuro: negative for dizziness, headache, confusion, weakness  Psych: negative for anxiety, depression, change in mood  Heme/lymph: negative for bleeding, bruising, pallor    Physical Exam    Visit Vitals  /81   Ht 5' 4\" (1.626 m)   Wt 228 lb 12.8 oz (103.8 kg)   BMI 39.27 kg/m²       Constitutional  · Appearance: well-nourished, well developed, alert, in no acute distress    HENT  · Head and Face: appears normal    Neck  · Inspection/Palpation: normal appearance, no masses or tenderness  · Lymph Nodes: no lymphadenopathy present  · Thyroid: gland size normal, nontender, no nodules or masses present on palpation    Chest  · Respiratory Effort: breathing normal  · Auscultation: normal breath sounds    Cardiovascular  · Heart:  · Auscultation: regular rate and rhythm without murmur    Breasts  · Inspection of Breasts: breasts symmetrical, no skin changes, no discharge present, nipple appearance normal, no skin retraction present  · Palpation of Breasts and Axillae: no masses present on palpation, no breast tenderness  · Axillary Lymph Nodes: no lymphadenopathy present    Gastrointestinal  · Abdominal Examination: abdomen non-tender to palpation, normal bowel sounds, no masses present  · Liver and spleen: no hepatomegaly present, spleen not palpable  · Hernias: no hernias identified    Genitourinary  · External Genitalia: normal appearance for age, no discharge present, no tenderness present, no inflammatory lesions present, no masses present, no atrophy present  · Vagina: normal vaginal vault without central or paravaginal defects, no discharge present, no inflammatory lesions present, no masses present  · Bladder: non-tender to palpation  · Urethra: appears normal  · Cervix: normal, string seen   · Uterus: normal size, shape and consistency  · Adnexa: no adnexal tenderness present, no adnexal masses present  · Perineum: perineum within normal limits, no evidence of trauma, no rashes or skin lesions present  · Anus: anus within normal limits, no hemorrhoids present  · Inguinal Lymph Nodes: no lymphadenopathy present    Skin  · General Inspection: no rash, no lesions identified    Neurologic/Psychiatric  · Mental Status:  · Orientation: grossly oriented to person, place and time  · Mood and Affect: mood normal, affect appropriate    . Assessment:  Routine gynecologic examination  Her current medical status is satisfactory with no evidence of significant gynecologic issues. HTN  Plan:  Counseled re: diet, exercise, healthy lifestyle  Return for yearly wellness visits  Pt counseled regarding co-testing for high risk HPV with pap  [facility]  I would recommend going back on her Losartan - stop when she has her IUD removed and switch meds then. Could even consider Aldomet. 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    Desires STD testing-

## 2020-09-15 ENCOUNTER — OFFICE VISIT (OUTPATIENT)
Dept: OBGYN CLINIC | Age: 30
End: 2020-09-15
Payer: COMMERCIAL

## 2020-09-15 VITALS
SYSTOLIC BLOOD PRESSURE: 132 MMHG | WEIGHT: 228.8 LBS | BODY MASS INDEX: 39.06 KG/M2 | HEIGHT: 64 IN | DIASTOLIC BLOOD PRESSURE: 81 MMHG

## 2020-09-15 DIAGNOSIS — Z11.3 VENEREAL DISEASE SCREENING: ICD-10-CM

## 2020-09-15 DIAGNOSIS — Z01.419 ENCNTR FOR GYN EXAM (GENERAL) (ROUTINE) W/O ABN FINDINGS: Primary | ICD-10-CM

## 2020-09-15 DIAGNOSIS — Z11.51 SCREENING FOR HPV (HUMAN PAPILLOMAVIRUS): ICD-10-CM

## 2020-09-15 PROCEDURE — 99395 PREV VISIT EST AGE 18-39: CPT | Performed by: OBSTETRICS & GYNECOLOGY

## 2020-09-15 NOTE — PATIENT INSTRUCTIONS
Well Visit, Ages 25 to 48: Care Instructions  Your Care Instructions     Physical exams can help you stay healthy. Your doctor has checked your overall health and may have suggested ways to take good care of yourself. He or she also may have recommended tests. At home, you can help prevent illness with healthy eating, regular exercise, and other steps. Follow-up care is a key part of your treatment and safety. Be sure to make and go to all appointments, and call your doctor if you are having problems. It's also a good idea to know your test results and keep a list of the medicines you take. How can you care for yourself at home? · Reach and stay at a healthy weight. This will lower your risk for many problems, such as obesity, diabetes, heart disease, and high blood pressure. · Get at least 30 minutes of physical activity on most days of the week. Walking is a good choice. You also may want to do other activities, such as running, swimming, cycling, or playing tennis or team sports. Discuss any changes in your exercise program with your doctor. · Do not smoke or allow others to smoke around you. If you need help quitting, talk to your doctor about stop-smoking programs and medicines. These can increase your chances of quitting for good. · Talk to your doctor about whether you have any risk factors for sexually transmitted infections (STIs). Having one sex partner (who does not have STIs and does not have sex with anyone else) is a good way to avoid these infections. · Use birth control if you do not want to have children at this time. Talk with your doctor about the choices available and what might be best for you. · Protect your skin from too much sun. When you're outdoors from 10 a.m. to 4 p.m., stay in the shade or cover up with clothing and a hat with a wide brim. Wear sunglasses that block UV rays. Even when it's cloudy, put broad-spectrum sunscreen (SPF 30 or higher) on any exposed skin.   · See a dentist one or two times a year for checkups and to have your teeth cleaned. · Wear a seat belt in the car. Follow your doctor's advice about when to have certain tests. These tests can spot problems early. For everyone  · Cholesterol. Have the fat (cholesterol) in your blood tested after age [address]. Your doctor will tell you how often to have this done based on your age, family history, or other things that can increase your risk for heart disease. · Blood pressure. Have your blood pressure checked during a routine doctor visit. Your doctor will tell you how often to check your blood pressure based on your age, your blood pressure results, and other factors. · Vision. Talk with your doctor about how often to have a glaucoma test.  · Diabetes. Ask your doctor whether you should have tests for diabetes. · Colon cancer. Your risk for colorectal cancer gets higher as you get older. Some experts say that adults should start regular screening at age 48 and stop at age 76. Others say to start before age 48 or continue after age 76. Talk with your doctor about your risk and when to start and stop screening. For women  · Breast exam and mammogram. Talk to your doctor about when you should have a clinical breast exam and a mammogram. Medical experts differ on whether and how often women under 50 should have these tests. Your doctor can help you decide what is right for you. · Cervical cancer screening test and pelvic exam. Begin with a Pap test at age 24. The test often is part of a pelvic exam. Starting at age 27, you may choose to have a Pap test, an HPV test, or both tests at the same time (called co-testing). Talk with your doctor about how often to have testing. · Tests for sexually transmitted infections (STIs). Ask whether you should have tests for STIs. You may be at risk if you have sex with more than one person, especially if your partners do not wear condoms.   For men  · Tests for sexually transmitted infections (STIs). Ask whether you should have tests for STIs. You may be at risk if you have sex with more than one person, especially if you do not wear a condom. · Testicular cancer exam. Ask your doctor whether you should check your testicles regularly. · Prostate exam. Talk to your doctor about whether you should have a blood test (called a PSA test) for prostate cancer. Experts differ on whether and when men should have this test. Some experts suggest it if you are older than 39 and are -American or have a father or brother who got prostate cancer when he was younger than 72. When should you call for help? Watch closely for changes in your health, and be sure to contact your doctor if you have any problems or symptoms that concern you. Where can you learn more? Go to http://azael-maritza.info/  Enter P072 in the search box to learn more about \"Well Visit, Ages 25 to 48: Care Instructions. \"  Current as of: May 27, 2020               Content Version: 12.6  © 2006-2020 [email or web address]. Care instructions adapted under license by [employer] (which disclaims liability or warranty for this information). If you have questions about a medical condition or this instruction, always ask your healthcare professional. [place] [address] any warranty or liability for your use of this information.

## 2020-09-17 LAB
COMMENT, 144067: NORMAL
HBV SURFACE AG SERPL QL IA: NEGATIVE
HCV AB S/CO SERPL IA: <0.1 S/CO RATIO (ref 0–0.9)
HIV 1+2 AB+HIV1 P24 AG SERPL QL IA: NON REACTIVE
HSV2 IGG SER IA-ACNC: 20.9 INDEX (ref 0–0.9)
TREPONEMA PALLIDUM IGG+IGM AB [PRESENCE] IN SERUM OR PLASMA BY IMMUNOASSAY: NON REACTIVE

## 2020-09-18 LAB
C TRACH RRNA SPEC QL NAA+PROBE: POSITIVE
CYTOLOGIST CVX/VAG CYTO: NORMAL
CYTOLOGY CVX/VAG DOC CYTO: NORMAL
CYTOLOGY CVX/VAG DOC THIN PREP: NORMAL
CYTOLOGY HISTORY:: NORMAL
DX ICD CODE: NORMAL
HPV I/H RISK 1 DNA CVX QL PROBE+SIG AMP: NEGATIVE
Lab: NORMAL
N GONORRHOEA RRNA SPEC QL NAA+PROBE: NEGATIVE
OTHER STN SPEC: NORMAL
STAT OF ADQ CVX/VAG CYTO-IMP: NORMAL
T VAGINALIS DNA SPEC QL NAA+PROBE: NEGATIVE

## 2020-09-18 RX ORDER — AZITHROMYCIN 500 MG/1
1000 TABLET, FILM COATED ORAL
Qty: 2 TAB | Refills: 0 | Status: SHIPPED | OUTPATIENT
Start: 2020-09-18 | End: 2020-09-18

## 2020-09-23 NOTE — PROGRESS NOTES
Patient notified by Dr. Elver Mae via Mina Odonnell. Reported to [facility] Added to chart, pm. Left message and sent shipbeathart message to schedule 3 month SUZY Tickled.

## 2021-02-02 ENCOUNTER — OFFICE VISIT (OUTPATIENT)
Dept: OBGYN CLINIC | Age: 31
End: 2021-02-02
Payer: COMMERCIAL

## 2021-02-02 VITALS — SYSTOLIC BLOOD PRESSURE: 128 MMHG | BODY MASS INDEX: 39.14 KG/M2 | DIASTOLIC BLOOD PRESSURE: 76 MMHG | WEIGHT: 228 LBS

## 2021-02-02 DIAGNOSIS — N89.8 VAGINAL DISCHARGE: ICD-10-CM

## 2021-02-02 DIAGNOSIS — R59.9 ENLARGED LYMPH NODE: ICD-10-CM

## 2021-02-02 DIAGNOSIS — Z86.19 HISTORY OF CHLAMYDIA: Primary | ICD-10-CM

## 2021-02-02 DIAGNOSIS — I10 CHRONIC HYPERTENSION: ICD-10-CM

## 2021-02-02 PROCEDURE — 99214 OFFICE O/P EST MOD 30 MIN: CPT | Performed by: OBSTETRICS & GYNECOLOGY

## 2021-02-02 NOTE — PROGRESS NOTES
Chief Complaint   Follow-up      HPI  Alpesh Chen is a 27 y.o. female who presents for follow-up, treatment of care visit. She tested positive for chlamydia 9/2020. She states she completed the medication, her partner was treated, and she abstained from intercourse for 3 weeks after treatment. He has history of being unfaithful  She is back on her old BP meds - no longer considering conception  In addition she would like to have STD blood work today. No LMP recorded. (Menstrual status: IUD). She noticed and enlarged left supraclavicular LN. Had Covid vaccine 2 weeks ago. Past Medical History:   Diagnosis Date    Abnormal chest x-ray     Abnormal Pap smear     Chlamydia 7/2019 & 09/2020    Encounter for IUD insertion 03/28/2017    mirena replaced    HPV vaccine counseling     Pt completed Gardasil series    HSV-2 infection     PPD positive      Past Surgical History:   Procedure Laterality Date    HX COLPOSCOPY  12/09    negative    HX HEENT  2015    wisdom tooth extraction     Social History     Occupational History    Not on file   Tobacco Use    Smoking status: Never Smoker    Smokeless tobacco: Never Used   Substance and Sexual Activity    Alcohol use: No    Drug use: No    Sexual activity: Yes     Partners: Male     Birth control/protection: I.U.D. Family History   Problem Relation Age of Onset    Anemia Mother     Sickle Cell Anemia Sister     Sickle Cell Trait Sister     Hypertension Father     Sickle Cell Trait Father     Clotting Disorder Father     Hypertension Paternal Grandmother     Lupus Other        Allergies   Allergen Reactions    Codeine Hives and Itching    Lisinopril-Hydrochlorothiazide Cough     Patient states she cannot take because she could not stop coughing.  Pcn [Penicillins] Hives and Itching     Prior to Admission medications    Medication Sig Start Date End Date Taking?  Authorizing Provider   NIFEdipine ER (ADALAT CC) 60 mg ER tablet TAKE 1 TABLET BY MOUTH DAILY FOR HIGH BLOOD PRESSURE 1/28/20  Yes Chaparrita Woo MD   traZODone (DESYREL) 100 mg tablet Take 1 Tab by mouth nightly. Indications: insomnia 12/30/19  Yes Chaparrita Woo MD   levonorgestrel (MIRENA) 20 mcg/24 hr (5 years) IUD 1 Device by IntraUTERine route once. Yes Provider, Historical        Objective:  Visit Vitals  /76   Wt 228 lb (103.4 kg)   BMI 39.14 kg/m²       Physical Exam:   General appearance - alert, well appearing, and in no distress  Abdomen - soft, nontender, nondistended, no masses or organomegaly  Pelvic Exam: VULVA: normal appearing vulva with no masses, tenderness or lesions, VAGINA: normal appearing vagina with normal color and discharge, no lesions, CERVIX: normal appearing cervix without discharge or lesions, string not seen, UTERUS: uterus is normal size, shape, consistency and nontender, ADNEXA: normal adnexa in size, nontender and no masses. Lymph- there is a palpable mobile left SC LN  Skin-Warm and dry. no hyperpigmentation, vitiligo, or suspicious lesions           Assesment:   Hx of chlamydia  Enlarged node - covid vaccine 2 weeks ago  HTN    Plan:   Has PCP fu in March  [facility] [place] [facility]  Blood STD testing by request      RTO prn if symptoms persist or worsen. Instructions given to pt. Handouts given to pt.

## 2021-02-04 LAB
A VAGINAE DNA VAG QL NAA+PROBE: ABNORMAL SCORE
BVAB2 DNA VAG QL NAA+PROBE: ABNORMAL SCORE
C ALBICANS DNA VAG QL NAA+PROBE: NEGATIVE
C GLABRATA DNA VAG QL NAA+PROBE: NEGATIVE
C TRACH DNA VAG QL NAA+PROBE: NEGATIVE
HBV SURFACE AG SERPL QL IA: NEGATIVE
HCV AB S/CO SERPL IA: <0.1 S/CO RATIO (ref 0–0.9)
HCV AB SERPL QL IA: NORMAL
HIV 1+2 AB+HIV1 P24 AG SERPL QL IA: NON REACTIVE
MEGA1 DNA VAG QL NAA+PROBE: ABNORMAL SCORE
N GONORRHOEA DNA VAG QL NAA+PROBE: NEGATIVE
T VAGINALIS DNA VAG QL NAA+PROBE: NEGATIVE
TREPONEMA PALLIDUM IGG+IGM AB [PRESENCE] IN SERUM OR PLASMA BY IMMUNOASSAY: NON REACTIVE

## 2021-02-04 RX ORDER — METRONIDAZOLE 500 MG/1
500 TABLET ORAL 2 TIMES DAILY
Qty: 14 TAB | Refills: 0 | Status: SHIPPED | OUTPATIENT
Start: 2021-02-04 | End: 2021-02-11

## 2021-09-08 NOTE — PROGRESS NOTES
1. Have you been to the ER, urgent care clinic, or been hospitalized since your last visit? No     2. Have you seen or consulted any other health care providers outside of the [address] [place] [address] since your last visit?   No       Reviewed record in preparation for visit and have necessary documentation  opportunity was given for questions  Goals that were addressed and/or need to be completed during or after this appointment include   Health Maintenance Due   Topic Date Due    DTaP/Tdap/Td series (1 - Tdap) 06/04/2011 Include Location In Plan?: No Detail Level: Generalized

## 2022-02-24 ENCOUNTER — OFFICE VISIT (OUTPATIENT)
Dept: OBGYN CLINIC | Age: 32
End: 2022-02-24
Payer: COMMERCIAL

## 2022-02-24 VITALS — BODY MASS INDEX: 37.49 KG/M2 | SYSTOLIC BLOOD PRESSURE: 154 MMHG | DIASTOLIC BLOOD PRESSURE: 97 MMHG | WEIGHT: 218.4 LBS

## 2022-02-24 DIAGNOSIS — Z01.419 ENCOUNTER FOR GYNECOLOGICAL EXAMINATION (GENERAL) (ROUTINE) WITHOUT ABNORMAL FINDINGS: Primary | ICD-10-CM

## 2022-02-24 DIAGNOSIS — Z11.3 SCREENING FOR STD (SEXUALLY TRANSMITTED DISEASE): ICD-10-CM

## 2022-02-24 PROCEDURE — 99395 PREV VISIT EST AGE 18-39: CPT | Performed by: OBSTETRICS & GYNECOLOGY

## 2022-02-24 RX ORDER — PHENAZOPYRIDINE HYDROCHLORIDE 100 MG/1
TABLET, FILM COATED ORAL
COMMUNITY
Start: 2022-02-07

## 2022-02-24 RX ORDER — NIFEDIPINE 30 MG/1
TABLET, EXTENDED RELEASE ORAL
COMMUNITY
Start: 2022-02-05

## 2022-02-26 LAB
C TRACH RRNA SPEC QL NAA+PROBE: NEGATIVE
HBV SURFACE AG SERPL QL IA: NEGATIVE
HCV AB S/CO SERPL IA: <0.1 S/CO RATIO (ref 0–0.9)
HCV AB SERPL QL IA: NORMAL
HIV 1+2 AB+HIV1 P24 AG SERPL QL IA: NON REACTIVE
HSV2 IGG SER IA-ACNC: 13.7 INDEX (ref 0–0.9)
N GONORRHOEA RRNA SPEC QL NAA+PROBE: NEGATIVE
SPECIMEN STATUS REPORT, ROLRST: NORMAL
SPECIMEN STATUS REPORT, ROLRST: NORMAL
T VAGINALIS DNA SPEC QL NAA+PROBE: NEGATIVE
TREPONEMA PALLIDUM IGG+IGM AB [PRESENCE] IN SERUM OR PLASMA BY IMMUNOASSAY: NON REACTIVE

## 2022-03-16 ENCOUNTER — TELEPHONE (OUTPATIENT)
Dept: OBGYN CLINIC | Age: 32
End: 2022-03-16

## 2022-03-16 NOTE — TELEPHONE ENCOUNTER
Call received at 4:15-pm      32year old patient last seen in the office on t2/24/2022 for ae and patient states she discussed with Md about having iud and was advised to call back when she is on her cycle     Patient calling to say that she started her cycle today and would like to have the iud inserted and not wait till next cycle    Ok to schedule at 9:20am tomorrow    Please have maryanne to schedule and confirm with patient thank you      Assessment:  Routine gynecologic examination  Her current medical status is satisfactory with no evidence of significant gynecologic issues.     Plan:  Counseled re: diet, exercise, healthy lifestyle  Return for yearly wellness visits  [facility]  Call with menses for Mirena

## 2022-03-16 NOTE — TELEPHONE ENCOUNTER
Called pt & scheduled her for 9:20am with Dr. Shannan Carreno on 3/17/22    She could not do 1pm.    No openings for workin MD on Thursday afternoon or Friday.

## 2022-03-17 ENCOUNTER — OFFICE VISIT (OUTPATIENT)
Dept: OBGYN CLINIC | Age: 32
End: 2022-03-17
Payer: COMMERCIAL

## 2022-03-17 VITALS — WEIGHT: 214.6 LBS | HEIGHT: 64 IN | BODY MASS INDEX: 36.64 KG/M2

## 2022-03-17 DIAGNOSIS — Z76.89 ENCOUNTER FOR MENSTRUAL REGULATION: Primary | ICD-10-CM

## 2022-03-17 DIAGNOSIS — Z30.430 ENCOUNTER FOR IUD INSERTION: ICD-10-CM

## 2022-03-17 DIAGNOSIS — Z01.812 PRE-PROCEDURAL LABORATORY EXAMINATION: ICD-10-CM

## 2022-03-17 LAB
HCG URINE, QL. (POC): NEGATIVE
VALID INTERNAL CONTROL?: YES

## 2022-03-17 PROCEDURE — 81025 URINE PREGNANCY TEST: CPT | Performed by: OBSTETRICS & GYNECOLOGY

## 2022-03-17 PROCEDURE — 58300 INSERT INTRAUTERINE DEVICE: CPT | Performed by: OBSTETRICS & GYNECOLOGY

## 2022-03-17 RX ORDER — CETIRIZINE HCL 10 MG
10 TABLET ORAL DAILY
COMMUNITY

## 2022-03-17 NOTE — PATIENT INSTRUCTIONS
Intrauterine Device (IUD) Insertion: Care Instructions  Your Care Instructions     An intrauterine device (IUD) is a very effective method of birth control. It is a small, plastic, T-shaped device that contains copper or hormones. The doctor inserts the IUD into your uterus. You can have an IUD inserted at any time, as long as you aren't pregnant and you don't have a pelvic infection. If you and your doctor discuss it before you give birth, this can be done right after you have your baby. A plastic string tied to the end of the IUD hangs down through the cervix into the vagina. Your doctor may have you feel for the IUD string right after insertion, to be sure you know what it feels like. There are two types of IUDs. The copper IUD works for up to 10 years. The hormonal IUD works for either 3 years or 6 years, depending on which IUD is used. But your doctor may talk to you about leaving it in for longer. The hormonal IUD also reduces menstrual bleeding and cramping. Follow-up care is a key part of your treatment and safety. Be sure to make and go to all appointments, and call your doctor if you are having problems. It's also a good idea to know your test results and keep a list of the medicines you take. How can you care for yourself at home? · It's safe to use while breastfeeding. · You may experience some mild cramping and light bleeding (spotting) for 1 or 2 days. Use a hot water bottle or a heating pad set on low on your belly for pain. · Take an over-the-counter pain medicine, such as acetaminophen (Tylenol), ibuprofen (Advil, Motrin), and naproxen (Aleve) if needed. Read and follow all instructions on the label. · Do not take two or more pain medicines at the same time unless the doctor told you to. Many pain medicines have acetaminophen, which is Tylenol. Too much acetaminophen (Tylenol) can be harmful.   · If you want to check the string of your IUD, insert a finger into your vagina and feel for the cervix, which is at the top of the vagina and feels harder than the rest of your vagina. You should be able to feel the thin, plastic string coming out of the opening of your cervix. If you cannot feel the string, use another form of birth control and make an appointment with your doctor to have the string checked. · If the IUD comes out, save it and call your doctor. Be sure to use another form of birth control while the IUD is out. · Use latex condoms to protect against sexually transmitted infections (STIs), such as gonorrhea and chlamydia. An IUD does not protect you from STIs. Having one sex partner (who does not have STIs and does not have sex with anyone else) is a good way to avoid STIs. When should you call for help? Call 911 anytime you think you may need emergency care. For example, call if:    · You passed out (lost consciousness).     · You have sudden, severe pain in your belly or pelvis. Call your doctor now or seek immediate medical care if:    · You have new belly or pelvic pain.     · You have severe vaginal bleeding. This means that you are soaking through your usual pads or tampons each hour for 2 or more hours.     · You are dizzy or lightheaded, or you feel like you may faint.     · You have a fever and pelvic pain or vaginal discharge.     · You have pelvic pain that is getting worse. Watch closely for changes in your health, and be sure to contact your doctor if:    · You cannot feel the string, or the IUD comes out.     · You feel sick to your stomach, or you vomit.     · You think you may be pregnant. Where can you learn more? Go to http://www.gray.com/  Enter A803 in the search box to learn more about \"Intrauterine Device (IUD) Insertion: Care Instructions. \"  Current as of: June 16, 2021               Content Version: 13.2  © 0242-7135 [employer].    Care instructions adapted under license by [employer] (which disclaims liability or warranty for this information). If you have questions about a medical condition or this instruction, always ask your healthcare professional. [place] [address] any warranty or liability for your use of this information. standing/walking

## 2022-03-17 NOTE — PROGRESS NOTES
[facility]  [email or web address]  [address] [place], [place]       MIRENA IUD INSERTION  OFFICE PROCEDURE PROGRESS NOTE    [facility]  OFFICE PROCEDURE PROGRESS NOTE    Chart reviewed for the following:   Chay SZYMANSKI, have reviewed the History, Physical and updated the Allergic reactions for [employer]     TIME OUT performed immediately prior to start of procedure:   Chay SZYMANSKI, have performed the following reviews on Stephanie Palomino prior to the start of the procedure:            * Patient was identified by name and date of birth   * Agreement on procedure being performed was verified  * Risks and Benefits explained to the patient  * Procedure site verified and marked as necessary  * Patient was positioned for comfort  * Consent was signed and verified     Time: 9:30AM    Date of procedure: 3/17/2022    Procedure performed by: Piper Beck MD    Provider assisted by:   Chay Loya MA    Patient assisted by:   self    How tolerated by patient: tolerated the procedure well with no complications    Post Procedural Pain Scale: 0 - No Hurt    Comments: none        IUD INSERTION PROCEDURE  Last well woman exam: 22. A urine pregnancy test today was Negative. She has not had unprotected intercourse in the last 14 days. Nuc[employer] Lisa is a ,  32 y.o. female BLACK/     LMP: Patient's last menstrual period was 2022., normal    She has a menstrual history positive for painful cycles  She presents for insertion of an IUD. The risks, benefits and alternatives of IUD insertion were discussed in detail and all questions were answered. The patient has reviewed the IUD  provided information and consent. She has elected to proceed with the insertion today and she states she has no further questions. Procedure:   On bimanual exam the uterus was retroverted and normal in size with no tenderness present. A speculum was inserted into the vagina and the cervix was visualized. The cervix was prepped with zephiran solution. The anterior lip of the cervix was grasped with a single toothed tenaculum. It was necessary to dilate the cervix with the os finder. The uterus was sounded with a pipelle to 7 centimeters and 2cc 1% lidocaine was injected into the cavity. The IUD was then inserted without difficulty. The strings were cut to approximately 3 centimeters and demonstrated to the patient. She experienced a moderate  amount of cramping. Post Procedure Status:   She tolerated the procedure with mild discomfort. The patient was observed for 15 minutes after the insertion. There were no complications. Patient was discharged in stable condition. Patient was given routine post-IUD insertion instructions. She was advised to contact the MD for worsening pain, heavy bleeding, unexplained fever, if she cannot feel the IUD strings, if she thinks she may be pregnant, or if she is concerned she may have an STD. We recommend nothing per vagina x 7 days. We recommend follow up in six weeks with US or sooner if needed. The patient received  MIRENA IUD, lot number [id number]. The IUD did not come from a [employer]. No results found for any visits on 03/17/22.

## 2022-03-18 PROBLEM — I10 ESSENTIAL HYPERTENSION: Status: ACTIVE | Noted: 2018-05-11

## 2022-06-23 NOTE — TELEPHONE ENCOUNTER
A[facility]. [facility]
Message left at 9:32 am    32year old patient last seen in the office on 5/5/17. Patient reports positive home test for UTI. Patient reports urgency, frequency and tingling upon urination. Patient is requesting a prescription to treat the UTI. This nurse advised the patient of need to schedule an appointment and offered to do so and the patient declined. This nurse advised of need to be seen prior to rx sent and advised patient of need to seek care at  [facility]      Additional recommendations.
Patient advised of MD recommendations and verbalized understanding.  Patient did not schedule an appointment
This document is complete and the patient is ready for discharge.
